# Patient Record
Sex: FEMALE | Race: WHITE | NOT HISPANIC OR LATINO | Employment: UNEMPLOYED | URBAN - METROPOLITAN AREA
[De-identification: names, ages, dates, MRNs, and addresses within clinical notes are randomized per-mention and may not be internally consistent; named-entity substitution may affect disease eponyms.]

---

## 2024-10-03 ENCOUNTER — TELEPHONE (OUTPATIENT)
Dept: OBSTETRICS AND GYNECOLOGY | Age: 24
End: 2024-10-03

## 2024-10-03 NOTE — TELEPHONE ENCOUNTER
Relayed information to pt's mother. Advised that they can call our office before starting any antibiotics for reassurance. Pt's mother stated understanding & has no questions or concerns at this time.

## 2024-10-03 NOTE — TELEPHONE ENCOUNTER
Spoke with pt's mother. Pt's mother is getting over strep throat & believes Negrita might be getting it due to same symptoms. I advised she may need to be seen at urgent care. Pt's mother stated that they called her PCP & was that she needed to call her OBGYN for an antibiotic safe during pregnancy. I advised we don't normally treat for this but that I would send a message. Please advise.

## 2024-10-03 NOTE — TELEPHONE ENCOUNTER
PT 19w 2d is calling in asking if something can be sent in for a soar throat PT says she is sick. Edi PT

## 2024-10-03 NOTE — TELEPHONE ENCOUNTER
Caller: BRAYDEN JULIEN    Relationship: Mother    Best call back number: [phone]      Who are you requesting to speak with (clinical staffELISE      What was the call regarding: BRAYDEN JULIEN ADVISED THAT PATIENT IS SICK HAS SORE THROAT, WOULD LIKE TO KNOW IF MEDICATION CAN BE CALLED INTO THE PHARMACY, PLEASE ADVISE.     ”

## 2024-10-21 ENCOUNTER — TELEPHONE (OUTPATIENT)
Dept: OBSTETRICS AND GYNECOLOGY | Age: 24
End: 2024-10-21

## 2024-10-21 NOTE — TELEPHONE ENCOUNTER
Caller: Negrita Yanez    Relationship: Self    Best call back number:    [phone]    What is the best time to reach you: ASAP    Who are you requesting to speak with (clinical staff, provider,  specific staff member):     DR MONTES    Do you know the name of the person who called:     DR MONTES    What was the call regarding:     UNSURE

## 2024-11-01 ENCOUNTER — ROUTINE PRENATAL (OUTPATIENT)
Dept: OBSTETRICS AND GYNECOLOGY | Age: 24
End: 2024-11-01
Payer: COMMERCIAL

## 2024-11-01 ENCOUNTER — HOSPITAL ENCOUNTER (OUTPATIENT)
Facility: HOSPITAL | Age: 24
Setting detail: OBSERVATION
Discharge: HOME | End: 2024-11-01
Attending: OBSTETRICS & GYNECOLOGY | Admitting: OBSTETRICS & GYNECOLOGY
Payer: COMMERCIAL

## 2024-11-01 VITALS
DIASTOLIC BLOOD PRESSURE: 47 MMHG | OXYGEN SATURATION: 97 % | TEMPERATURE: 98.3 F | WEIGHT: 224 LBS | BODY MASS INDEX: 43.98 KG/M2 | HEART RATE: 86 BPM | HEIGHT: 60 IN | RESPIRATION RATE: 12 BRPM | SYSTOLIC BLOOD PRESSURE: 126 MMHG

## 2024-11-01 VITALS — WEIGHT: 224.6 LBS | BODY MASS INDEX: 43.86 KG/M2 | SYSTOLIC BLOOD PRESSURE: 114 MMHG | DIASTOLIC BLOOD PRESSURE: 76 MMHG

## 2024-11-01 DIAGNOSIS — O34.219 PREVIOUS CESAREAN DELIVERY AFFECTING PREGNANCY: ICD-10-CM

## 2024-11-01 DIAGNOSIS — Z34.92 PRENATAL CARE IN SECOND TRIMESTER: ICD-10-CM

## 2024-11-01 DIAGNOSIS — Z3A.23 23 WEEKS GESTATION OF PREGNANCY: Primary | ICD-10-CM

## 2024-11-01 DIAGNOSIS — O21.0 HYPEREMESIS COMPLICATING PREGNANCY, ANTEPARTUM: ICD-10-CM

## 2024-11-01 DIAGNOSIS — Z21 CONGENITAL HIV INFECTION: ICD-10-CM

## 2024-11-01 PROBLEM — Z34.90 PREGNANCY: Status: ACTIVE | Noted: 2024-11-01

## 2024-11-01 PROCEDURE — 96374 THER/PROPH/DIAG INJ IV PUSH: CPT

## 2024-11-01 PROCEDURE — G0378 HOSPITAL OBSERVATION PER HR: HCPCS

## 2024-11-01 PROCEDURE — 96361 HYDRATE IV INFUSION ADD-ON: CPT

## 2024-11-01 PROCEDURE — 25810000003 LACTATED RINGERS SOLUTION: Performed by: OBSTETRICS & GYNECOLOGY

## 2024-11-01 PROCEDURE — G0463 HOSPITAL OUTPT CLINIC VISIT: HCPCS

## 2024-11-01 PROCEDURE — 25010000002 ONDANSETRON PER 1 MG: Performed by: OBSTETRICS & GYNECOLOGY

## 2024-11-01 RX ORDER — SODIUM CHLORIDE 0.9 % (FLUSH) 0.9 %
10 SYRINGE (ML) INJECTION EVERY 12 HOURS SCHEDULED
Status: DISCONTINUED | OUTPATIENT
Start: 2024-11-01 | End: 2024-11-01 | Stop reason: HOSPADM

## 2024-11-01 RX ORDER — SODIUM CHLORIDE 9 MG/ML
40 INJECTION, SOLUTION INTRAVENOUS AS NEEDED
Status: DISCONTINUED | OUTPATIENT
Start: 2024-11-01 | End: 2024-11-01 | Stop reason: HOSPADM

## 2024-11-01 RX ORDER — ONDANSETRON 2 MG/ML
4 INJECTION INTRAMUSCULAR; INTRAVENOUS EVERY 6 HOURS PRN
Status: DISCONTINUED | OUTPATIENT
Start: 2024-11-01 | End: 2024-11-01 | Stop reason: HOSPADM

## 2024-11-01 RX ADMIN — ONDANSETRON 4 MG: 2 INJECTION, SOLUTION INTRAMUSCULAR; INTRAVENOUS at 13:19

## 2024-11-01 RX ADMIN — Medication 10 ML: at 13:19

## 2024-11-01 RX ADMIN — SODIUM CHLORIDE, POTASSIUM CHLORIDE, SODIUM LACTATE AND CALCIUM CHLORIDE 1000 ML: 600; 310; 30; 20 INJECTION, SOLUTION INTRAVENOUS at 13:18

## 2024-11-01 NOTE — PROGRESS NOTES
Patient is having a nausea and vomiting episode  She feels weak  We discussed oral hydration at home versus IV fluids  The patient really feels like she is dehydrated and we will send her over for IV fluids  Also some IV Zofran  Hoping that she can return to home after that for management  She has been able to take her HIV meds without any problems  We discussed checking her blood sugars as she is reluctant to do the 1 hour glucose challenge test with her problems with nausea  She will check some blood sugars further her next visit  Follow-up 4 weeks    Chief Complaint   Patient presents with    Routine Prenatal Visit     Ob Check - Pt is 23w3d, Pt stating she has not been checking blood sugars, Pt c/o nausea, vomiting & diarrhea, pt stating she has trouble keeping anything down         HPI:  Pt presents for routine prenatal visit    ROS:  No fever or chills, no nausea or vomiting, no contractions, no leg pain, no LE edema, no leaking fluid, no bleeding, no headache, no dysuria  All other systems reviewed and negative    Exam:  See flow sheet  General:  Alert and oriented and no distress  Neck: no lymphadenopathy or thyromegaly  Lungs: non - labored breathing  Abd:  See flow sheet, fundus nontender  Ext: see flow sheet, non-tender bilateral , no lesions  Neuro: grossly normal    Assessment:/ PLAN:    Diagnoses and all orders for this visit:    1. 23 weeks gestation of pregnancy (Primary)  -     POC Urinalysis Dipstick    2. Congenital HIV infection    3. Previous  delivery affecting pregnancy    4. Prenatal care in second trimester    5. Hyperemesis complicating pregnancy, antepartum

## 2024-11-01 NOTE — NURSING NOTE
Discharge instructions reviewed with patient.  Discussed leaking of fluids, vaginal bleeding, and contractions.  Patient to continue to go to all regularly scheduled appointments.  Patient to return to L&D or call on call MD if any questions or concerns.  Patient ambulates off unit with no apparent distress.

## 2024-11-04 ENCOUNTER — TELEPHONE (OUTPATIENT)
Dept: OBSTETRICS AND GYNECOLOGY | Age: 24
End: 2024-11-04
Payer: COMMERCIAL

## 2024-11-04 ENCOUNTER — HOSPITAL ENCOUNTER (EMERGENCY)
Facility: HOSPITAL | Age: 24
Discharge: HOME | End: 2024-11-04
Attending: OBSTETRICS & GYNECOLOGY | Admitting: OBSTETRICS & GYNECOLOGY
Payer: COMMERCIAL

## 2024-11-04 VITALS
RESPIRATION RATE: 16 BRPM | HEIGHT: 60 IN | TEMPERATURE: 98.7 F | SYSTOLIC BLOOD PRESSURE: 117 MMHG | DIASTOLIC BLOOD PRESSURE: 67 MMHG | WEIGHT: 224 LBS | BODY MASS INDEX: 43.98 KG/M2 | HEART RATE: 101 BPM

## 2024-11-04 LAB
ALBUMIN SERPL-MCNC: 3.4 G/DL (ref 3.5–5.2)
ALBUMIN/GLOB SERPL: 1.2 G/DL
ALP SERPL-CCNC: 66 U/L (ref 39–117)
ALT SERPL W P-5'-P-CCNC: 8 U/L (ref 1–33)
ANION GAP SERPL CALCULATED.3IONS-SCNC: 11.3 MMOL/L (ref 5–15)
AST SERPL-CCNC: 10 U/L (ref 1–32)
BACTERIA UR QL AUTO: ABNORMAL /HPF
BASOPHILS # BLD AUTO: 0.02 10*3/MM3 (ref 0–0.2)
BASOPHILS NFR BLD AUTO: 0.2 % (ref 0–1.5)
BILIRUB SERPL-MCNC: <0.2 MG/DL (ref 0–1.2)
BILIRUB UR QL STRIP: NEGATIVE
BUN SERPL-MCNC: 7 MG/DL (ref 6–20)
BUN/CREAT SERPL: 16.7 (ref 7–25)
CALCIUM SPEC-SCNC: 8.5 MG/DL (ref 8.6–10.5)
CHLORIDE SERPL-SCNC: 104 MMOL/L (ref 98–107)
CLARITY UR: ABNORMAL
CO2 SERPL-SCNC: 20.7 MMOL/L (ref 22–29)
COLOR UR: YELLOW
CREAT SERPL-MCNC: 0.42 MG/DL (ref 0.57–1)
DEPRECATED RDW RBC AUTO: 44.8 FL (ref 37–54)
EGFRCR SERPLBLD CKD-EPI 2021: 139.4 ML/MIN/1.73
EOSINOPHIL # BLD AUTO: 0.07 10*3/MM3 (ref 0–0.4)
EOSINOPHIL NFR BLD AUTO: 0.8 % (ref 0.3–6.2)
ERYTHROCYTE [DISTWIDTH] IN BLOOD BY AUTOMATED COUNT: 14 % (ref 12.3–15.4)
GLOBULIN UR ELPH-MCNC: 2.8 GM/DL
GLUCOSE SERPL-MCNC: 119 MG/DL (ref 65–99)
GLUCOSE UR STRIP-MCNC: NEGATIVE MG/DL
HCT VFR BLD AUTO: 30.4 % (ref 34–46.6)
HGB BLD-MCNC: 10.8 G/DL (ref 12–15.9)
HGB UR QL STRIP.AUTO: NEGATIVE
HYALINE CASTS UR QL AUTO: ABNORMAL /LPF
IMM GRANULOCYTES # BLD AUTO: 0.05 10*3/MM3 (ref 0–0.05)
IMM GRANULOCYTES NFR BLD AUTO: 0.6 % (ref 0–0.5)
KETONES UR QL STRIP: ABNORMAL
LEUKOCYTE ESTERASE UR QL STRIP.AUTO: ABNORMAL
LIPASE SERPL-CCNC: 16 U/L (ref 13–60)
LYMPHOCYTES # BLD AUTO: 1.41 10*3/MM3 (ref 0.7–3.1)
LYMPHOCYTES NFR BLD AUTO: 16.7 % (ref 19.6–45.3)
MCH RBC QN AUTO: 31.6 PG (ref 26.6–33)
MCHC RBC AUTO-ENTMCNC: 35.5 G/DL (ref 31.5–35.7)
MCV RBC AUTO: 88.9 FL (ref 79–97)
MONOCYTES # BLD AUTO: 0.5 10*3/MM3 (ref 0.1–0.9)
MONOCYTES NFR BLD AUTO: 5.9 % (ref 5–12)
NEUTROPHILS NFR BLD AUTO: 6.41 10*3/MM3 (ref 1.7–7)
NEUTROPHILS NFR BLD AUTO: 75.8 % (ref 42.7–76)
NITRITE UR QL STRIP: NEGATIVE
NRBC BLD AUTO-RTO: 0 /100 WBC (ref 0–0.2)
PH UR STRIP.AUTO: 6 [PH] (ref 5–8)
PLATELET # BLD AUTO: 226 10*3/MM3 (ref 140–450)
PMV BLD AUTO: 9.6 FL (ref 6–12)
POTASSIUM SERPL-SCNC: 3.3 MMOL/L (ref 3.5–5.2)
PROT SERPL-MCNC: 6.2 G/DL (ref 6–8.5)
PROT UR QL STRIP: ABNORMAL
RBC # BLD AUTO: 3.42 10*6/MM3 (ref 3.77–5.28)
RBC # UR STRIP: ABNORMAL /HPF
REF LAB TEST METHOD: ABNORMAL
SODIUM SERPL-SCNC: 136 MMOL/L (ref 136–145)
SP GR UR STRIP: 1.02 (ref 1–1.03)
SQUAMOUS #/AREA URNS HPF: ABNORMAL /HPF
UROBILINOGEN UR QL STRIP: ABNORMAL
WBC # UR STRIP: ABNORMAL /HPF
WBC NRBC COR # BLD AUTO: 8.46 10*3/MM3 (ref 3.4–10.8)

## 2024-11-04 PROCEDURE — 80053 COMPREHEN METABOLIC PANEL: CPT | Performed by: OBSTETRICS & GYNECOLOGY

## 2024-11-04 PROCEDURE — 25810000003 LACTATED RINGERS SOLUTION: Performed by: OBSTETRICS & GYNECOLOGY

## 2024-11-04 PROCEDURE — 99283 EMERGENCY DEPT VISIT LOW MDM: CPT | Performed by: OBSTETRICS & GYNECOLOGY

## 2024-11-04 PROCEDURE — 96361 HYDRATE IV INFUSION ADD-ON: CPT | Performed by: OBSTETRICS & GYNECOLOGY

## 2024-11-04 PROCEDURE — 87086 URINE CULTURE/COLONY COUNT: CPT | Performed by: OBSTETRICS & GYNECOLOGY

## 2024-11-04 PROCEDURE — 25010000002 ONDANSETRON PER 1 MG: Performed by: OBSTETRICS & GYNECOLOGY

## 2024-11-04 PROCEDURE — 96374 THER/PROPH/DIAG INJ IV PUSH: CPT | Performed by: OBSTETRICS & GYNECOLOGY

## 2024-11-04 PROCEDURE — 83690 ASSAY OF LIPASE: CPT | Performed by: OBSTETRICS & GYNECOLOGY

## 2024-11-04 PROCEDURE — 81001 URINALYSIS AUTO W/SCOPE: CPT | Performed by: OBSTETRICS & GYNECOLOGY

## 2024-11-04 PROCEDURE — 85025 COMPLETE CBC W/AUTO DIFF WBC: CPT | Performed by: OBSTETRICS & GYNECOLOGY

## 2024-11-04 RX ORDER — ONDANSETRON 2 MG/ML
4 INJECTION INTRAMUSCULAR; INTRAVENOUS EVERY 6 HOURS PRN
Status: DISCONTINUED | OUTPATIENT
Start: 2024-11-04 | End: 2024-11-04 | Stop reason: HOSPADM

## 2024-11-04 RX ORDER — POTASSIUM CHLORIDE 1500 MG/1
20 TABLET, EXTENDED RELEASE ORAL 2 TIMES DAILY
Qty: 6 TABLET | Refills: 0 | Status: SHIPPED | OUTPATIENT
Start: 2024-11-04 | End: 2024-11-07

## 2024-11-04 RX ADMIN — SODIUM CHLORIDE, POTASSIUM CHLORIDE, SODIUM LACTATE AND CALCIUM CHLORIDE 1000 ML: 600; 310; 30; 20 INJECTION, SOLUTION INTRAVENOUS at 17:34

## 2024-11-04 RX ADMIN — ONDANSETRON 4 MG: 2 INJECTION, SOLUTION INTRAMUSCULAR; INTRAVENOUS at 17:35

## 2024-11-04 NOTE — OBED NOTES
[facility] Note OB        Patient Name: Negrita Silvestre  YOB: 2000  MRN: [id number]  Admission Date: 2024  4:48 PM  Date of Service: 2024    Chief Complaint: Fall (Pt states she fell this AM after getting out of the  shower around 0900./) and Nausea (Pt states she has been dealing with N/V for about a month./)        Subjective     Negrita Silvestre is a 24 y.o. female  at 23w6d with Estimated Date of Delivery: 25 who presents with two complaints.  The patient states that she has had persistent nausea/vomiting for the past month and generally feels unwell. She fell on her bottom this morning, denies any abdominal trauma.  She sees Lianne Laboy MD for her prenatal care. Her pregnancy has been complicated by:  congenital HIV, Rh negative, hyperemesis, previous  section, history of gestational diabetes, Rh negative.    She describes fetal movement as normal.  She denies rupture of membranes.  She denies vaginal bleeding. She is not feeling contractions.          Objective   Patient Active Problem List    Diagnosis     Hyperemesis complicating pregnancy, antepartum [O21.0]     Pregnancy [Z34.90]     Previous  delivery affecting pregnancy [O34.219]     Nausea and vomiting of pregnancy, antepartum [O21.9]     Nausea and vomiting [R11.2]     Right upper quadrant abdominal pain [R10.11]     Left upper quadrant abdominal pain [R10.12]      delivery delivered [O82]     Encounter for sterilization [Z30.2]      labor in third trimester [O60.03]     Rh negative status during pregnancy in third trimester [O26.893, Z67.91]     Increased BMI [R63.8]     Maternal anemia in pregnancy, antepartum [O99.019]     Anxiety in pregnancy, antepartum [O99.340, F41.9]     Congenital HIV infection [Z21]     Diet controlled gestational diabetes mellitus (GDM) in third trimester [O24.410]         OB History    Para Term  AB Living   2 1 1 0 0 1    SAB IAB Ectopic Molar Multiple Live Births   0 0 0 0 0 1      # Outcome Date GA Lbr Michael/2nd Weight Sex Type Anes PTL Lv   2 Current            1 Term 02/10/24 38w0d  3960 g (8 lb 11.7 oz) F CS-LTranv Spinal N BAKARI      Birth Comments: panda or 1      Name: Deirdre Miranda      Apgar1: 8  Apgar5: 9        Past Medical History:   Diagnosis Date    Anxiety     on Zoloft    Asperger's disorder     Depression     Gestational diabetes     diet controlled    HIV (human immunodeficiency virus infection)     congential, on meds    [place] teeth extracted        Past Surgical History:   Procedure Laterality Date     SECTION WITH TUBAL N/A 2/10/2024    Procedure:  SECTION PRIMARY WITH TUBAL;  Surgeon: Nancy Sheth MD;  Location: [facility] LABOR DELIVERY;  Service: Obstetrics/Gynecology;  Laterality: N/A;    ENDOSCOPY N/A 2024    Procedure: ESOPHAGOGASTRODUODENOSCOPY WITH BIOPSIES;  Surgeon: Josue Wright MD;  [place] [facility] [place]  Service: Gastroenterology;  Laterality: N/A;  esophagitis    EYE SURGERY         No current facility-administered medications on file prior to encounter.     Current Outpatient Medications on File Prior to Encounter   Medication Sig Dispense Refill    Emtricitabine-Tenofovir AF (Descovy) 200-25 MG per tablet Take 1 tablet by mouth Daily for 90 days. 30 tablet 2    ondansetron ODT (ZOFRAN-ODT) 8 MG disintegrating tablet Place 1 tablet on the tongue Every 12 (Twelve) Hours As Needed for Nausea or Vomiting. 30 tablet 1    pantoprazole (PROTONIX) 40 MG EC tablet Take 1 tablet by mouth Daily. 90 tablet 3    Prenatal Vit-Fe Fumarate-FA (Prenatal Vitamin) 27-0.8 MG tablet Take 1 tablet by mouth Daily. 30 tablet 12    Prezista 600 MG tablet TAKE 1 TABLET BY MOUTH TWICE DAILY WITH MEALS 60 tablet 2    promethazine (PHENERGAN) 12.5 MG tablet Take 1 tablet by mouth Every 6 (Six) Hours As Needed for Nausea or Vomiting. 30 tablet 1    ritonavir (NORVIR) 100 MG  "tablet Take 1 tablet by mouth 2 (Two) Times a Day for 90 days. 60 tablet 2    sertraline (Zoloft) 100 MG tablet Take 2 tablets by mouth Daily for 364 days. 180 tablet 3       Allergies   Allergen Reactions    Bactrim [Sulfamethoxazole-Trimethoprim] Hives    Sulfa Antibiotics Hives    [place] Rash       Family History   Adopted: Yes   Problem Relation Age of Onset    Colon cancer Neg Hx     Colon polyps Neg Hx     Crohn's disease Neg Hx     Irritable bowel syndrome Neg Hx     Ulcerative colitis Neg Hx        Social History     Socioeconomic History    Marital status:    Tobacco Use    Smoking status: Former     Current packs/day: 0.00     Types: Cigarettes     Quit date: 2023     Years since quittin.5     Passive exposure: Never    Smokeless tobacco: Never   Vaping Use    Vaping status: Former    Quit date: 2023   Substance and Sexual Activity    Alcohol use: Not Currently    Drug use: Not Currently    Sexual activity: Yes     Partners: Male     Birth control/protection: None           Review of Systems   Constitutional:  Positive for fatigue.   HENT: Negative.     Eyes: Negative.    Respiratory: Negative.     Gastrointestinal:  Positive for nausea and vomiting. Negative for abdominal pain.   Genitourinary: Negative.    Neurological: Negative.           PHYSICAL EXAM:      VITAL SIGNS:  Vitals:    24 1709   BP: 117/67   BP Location: Right arm   Patient Position: Sitting   Pulse: 101   Resp: 16   Temp: 98.7 °F (37.1 °C)   TempSrc: Oral   Weight: 102 kg (224 lb)   Height: 152.4 cm (60\")        FHT:              140 bpm        PHYSICAL EXAM:    General: well developed; well nourished  no acute distress  mentation appropriate   Heart: Not performed.   Lungs  : breathing is unlabored     Abdomen: soft, non-tender; no masses  no umbilical or inguinal hernias are present  no hepato-splenomegaly       Cervix: was not checked.      Contractions: none        Extremities: peripheral pulses normal, no " pedal edema, no clubbing or cyanosis      LABS AND TESTING ORDERED:  Uterine and fetal monitoring  Urinalysis  CBC, CMP, Lipase  IV fluid bolus    LAB RESULTS:    Recent Results (from the past 24 hours)   Comprehensive Metabolic Panel    Collection Time: 11/04/24  5:34 PM    Specimen: Arm, Left; Blood   Result Value Ref Range    Glucose 119 (H) 65 - 99 mg/dL    BUN 7 6 - 20 mg/dL    Creatinine 0.42 (L) 0.57 - 1.00 mg/dL    Sodium 136 136 - 145 mmol/L    Potassium 3.3 (L) 3.5 - 5.2 mmol/L    Chloride 104 98 - 107 mmol/L    CO2 20.7 (L) 22.0 - 29.0 mmol/L    Calcium 8.5 (L) 8.6 - 10.5 mg/dL    Total Protein 6.2 6.0 - 8.5 g/dL    Albumin 3.4 (L) 3.5 - 5.2 g/dL    ALT (SGPT) 8 1 - 33 U/L    AST (SGOT) 10 1 - 32 U/L    Alkaline Phosphatase 66 39 - 117 U/L    Total Bilirubin <0.2 0.0 - 1.2 mg/dL    Globulin 2.8 gm/dL    A/G Ratio 1.2 g/dL    BUN/Creatinine Ratio 16.7 7.0 - 25.0    Anion Gap 11.3 5.0 - 15.0 mmol/L    eGFR 139.4 >60.0 mL/min/1.73   CBC Auto Differential    Collection Time: 11/04/24  5:34 PM    Specimen: Arm, Left; Blood   Result Value Ref Range    WBC 8.46 3.40 - 10.80 10*3/mm3    RBC 3.42 (L) 3.77 - 5.28 10*6/mm3    Hemoglobin 10.8 (L) 12.0 - 15.9 g/dL    Hematocrit 30.4 (L) 34.0 - 46.6 %    MCV 88.9 79.0 - 97.0 fL    MCH 31.6 26.6 - 33.0 pg    MCHC 35.5 31.5 - 35.7 g/dL    RDW 14.0 12.3 - 15.4 %    RDW-SD 44.8 37.0 - 54.0 fl    MPV 9.6 6.0 - 12.0 fL    Platelets 226 140 - 450 10*3/mm3    Neutrophil % 75.8 42.7 - 76.0 %    Lymphocyte % 16.7 (L) 19.6 - 45.3 %    Monocyte % 5.9 5.0 - 12.0 %    Eosinophil % 0.8 0.3 - 6.2 %    Basophil % 0.2 0.0 - 1.5 %    Immature Grans % 0.6 (H) 0.0 - 0.5 %    Neutrophils, Absolute 6.41 1.70 - 7.00 10*3/mm3    Lymphocytes, Absolute 1.41 0.70 - 3.10 10*3/mm3    Monocytes, Absolute 0.50 0.10 - 0.90 10*3/mm3    Eosinophils, Absolute 0.07 0.00 - 0.40 10*3/mm3    Basophils, Absolute 0.02 0.00 - 0.20 10*3/mm3    Immature Grans, Absolute 0.05 0.00 - 0.05 10*3/mm3    nRBC 0.0 0.0 -  0.2 /100 WBC   Lipase    Collection Time: 24  5:34 PM    Specimen: Blood   Result Value Ref Range    Lipase 16 13 - 60 U/L   Urinalysis With Culture If Indicated - Urine, Clean Catch    Collection Time: 24  5:36 PM    Specimen: Urine, Clean Catch   Result Value Ref Range    Color, UA Yellow Yellow, Straw    Appearance, UA Cloudy (A) Clear    pH, UA 6.0 5.0 - 8.0    Specific Gravity, UA 1.025 1.005 - 1.030    Glucose, UA Negative Negative    Ketones, UA Trace (A) Negative    Bilirubin, UA Negative Negative    Blood, UA Negative Negative    Protein, UA Trace (A) Negative    Leuk Esterase, UA Moderate (2+) (A) Negative    Nitrite, UA Negative Negative    Urobilinogen, UA 1.0 E.U./dL 0.2 - 1.0 E.U./dL   Urinalysis, Microscopic Only - Urine, Clean Catch    Collection Time: 24  5:36 PM    Specimen: Urine, Clean Catch   Result Value Ref Range    RBC, UA 0-2 None Seen, 0-2 /HPF    WBC, UA 11-20 (A) None Seen, 0-2 /HPF    Bacteria, UA 3+ (A) None Seen /HPF    Squamous Epithelial Cells, UA 13-20 (A) None Seen, 0-2 /HPF    Hyaline Casts, UA 0-2 None Seen /LPF    Methodology Automated Microscopy        Lab Results   Component Value Date    ABO A 2024    RH Negative 2024       Lab Results   Component Value Date    STREPGPB Negative 2024                 Assessment & Plan     ASSESSMENT/PLAN:  Negrita Silvestre is a 24 y.o. female  at 23w6d who presented with:     Nausea/vomiting  S/p fall  Hypokalemia        PLAN:     Patient given IV fluid bolus and IV Zofran.  She is able to tolerate crackers and desires discharge   Patient has Zofran and Phenergan at home  Prescription for Kdur 20 meq sent to pharmacy for mild hypokalemia  Patient discharged home.  She agrees to return to [facility] if she is not able to tolerate oral intake or has signs of labor.    I have spent 30 minutes including face to face time with the patient, greater than 50% in discussion of the diagnosis (counseling) and/or  coordination of care.     Carol Rosales MD  11/4/2024  19:00 EST  OB Hospitalist  Phone:  [phone]

## 2024-11-04 NOTE — TELEPHONE ENCOUNTER
Pt currently 23w6d and was recently treated at the hospital with IV fluids.  Pt's mother called stating her daughter is still very weak and vomiting.  She is not keeping anything down.  She states she fell this morning due to the weakness.  She did not hit her abdomen.  Advised that pt needs to return to the hospital for treatment but she wanted to check with Dr. Edi mulligan.  Please advise.

## 2024-11-05 ENCOUNTER — TELEPHONE (OUTPATIENT)
Dept: OBSTETRICS AND GYNECOLOGY | Age: 24
End: 2024-11-05

## 2024-11-05 NOTE — TELEPHONE ENCOUNTER
Caller: BRAYDEN JULIEN    Relationship: Mother    Best call back number: [phone]    What orders are you requesting (i.e. lab or imaging): HPOLARI TEST    In what timeframe would the patient need to come in: ASAP    Where will you receive your lab/imaging services: [facility] [address] OR CAN GO TO [facility] IF NEEDED    Additional notes: PT'S MOTHER CALLED IN WANTING TO KNOW IF PT COULD BE TESTED FOR HPOLARI.

## 2024-11-06 LAB — BACTERIA SPEC AEROBE CULT: NO GROWTH

## 2024-11-15 ENCOUNTER — TELEPHONE (OUTPATIENT)
Dept: OBSTETRICS AND GYNECOLOGY | Age: 24
End: 2024-11-15

## 2024-11-15 NOTE — TELEPHONE ENCOUNTER
See message. Pt is 25w3d. I phoned & spoke with pt's mother regarding concerns. Pt's mother stated Negrita has been feeling spontaneous abdominal cramping x's a few days. She states it its not consistent but at times happens 4-5 times within an hour. Negrita's mother states she feels like it is contractions and not rickey ramírez. She denies any bleeding or fluid loss but states the pt has had white mucous-like discharge. Pt does not have an appt with Dr. Daley until 12/2/24. Please advise.

## 2024-11-15 NOTE — TELEPHONE ENCOUNTER
Provider:  DR MONTES    Caller:BRAYDEN NIETOLEY-MOTHER    Phone Number:[phone]     Reason for Call:PATIENTS MOTHER IS CALLING TO SEE IF A LETTER CAN BE SENT TO HER MY CHART FOR [employer] STATING THAT THE PATIENT ISNT ABLE TO WORK AND THAT SHE IS PREGNANT WITH EXPECTED DUE DATE//    ALSO THE MOTHER IS CONCERNED BECAUSE SHE FEELS LIKE THE PATIENT WAS HAVING CONTRACTIONS LAST NIGHT BUT THEY WILL COME AND GO//NOT SURE WHAT THEY NEED TO DO IF THIS HAPPENS AGAIN BECAUSE SHE IS JUST OVER 25 WEEKS//PLEASE FOLLOW UP    MOTHER STATED THAT SHE WOULD PROBABLY BE BEST TO CONTACT BECAUSE PATIENT SLEEPS A LOT BECAUSE SHE IS EXHAUSTED- HAS AUTOIMMUNE ISSUES

## 2024-11-16 ENCOUNTER — HOSPITAL ENCOUNTER (EMERGENCY)
Facility: HOSPITAL | Age: 24
Discharge: HOME | End: 2024-11-16
Attending: OBSTETRICS & GYNECOLOGY | Admitting: OBSTETRICS & GYNECOLOGY
Payer: COMMERCIAL

## 2024-11-16 VITALS
BODY MASS INDEX: 44.88 KG/M2 | HEIGHT: 60 IN | SYSTOLIC BLOOD PRESSURE: 121 MMHG | DIASTOLIC BLOOD PRESSURE: 72 MMHG | OXYGEN SATURATION: 97 % | TEMPERATURE: 98.4 F | HEART RATE: 89 BPM | RESPIRATION RATE: 16 BRPM | WEIGHT: 228.6 LBS

## 2024-11-16 LAB
BACTERIA UR QL AUTO: ABNORMAL /HPF
BILIRUB UR QL STRIP: NEGATIVE
CLARITY UR: ABNORMAL
COD CRY URNS QL: PRESENT /HPF
COLOR UR: YELLOW
GLUCOSE UR STRIP-MCNC: NEGATIVE MG/DL
HGB UR QL STRIP.AUTO: NEGATIVE
HYALINE CASTS UR QL AUTO: ABNORMAL /LPF
KETONES UR QL STRIP: NEGATIVE
LEUKOCYTE ESTERASE UR QL STRIP.AUTO: ABNORMAL
NITRITE UR QL STRIP: NEGATIVE
PH UR STRIP.AUTO: 6 [PH] (ref 5–8)
PROT UR QL STRIP: NEGATIVE
RBC # UR STRIP: ABNORMAL /HPF
REF LAB TEST METHOD: ABNORMAL
SP GR UR STRIP: 1.02 (ref 1–1.03)
SQUAMOUS #/AREA URNS HPF: ABNORMAL /HPF
UROBILINOGEN UR QL STRIP: ABNORMAL
WBC # UR STRIP: ABNORMAL /HPF

## 2024-11-16 PROCEDURE — 99284 EMERGENCY DEPT VISIT MOD MDM: CPT | Performed by: OBSTETRICS & GYNECOLOGY

## 2024-11-16 PROCEDURE — 87086 URINE CULTURE/COLONY COUNT: CPT | Performed by: OBSTETRICS & GYNECOLOGY

## 2024-11-16 PROCEDURE — 81001 URINALYSIS AUTO W/SCOPE: CPT | Performed by: OBSTETRICS & GYNECOLOGY

## 2024-11-17 NOTE — OBED NOTES
[facility] Provider Note        2024 21:29 BRINDA Silvestre is a 24 y.o.  at 25w4d BRIGID  2025, by Other Basis who presents for : Contractions (Started last night and throughout the day today. Stopped around 0700 and then restarted. Denies vaginal bleeding but having some mucus. Positive fetal movement.)          HPI: Patient complains of intermittent contractions for the past two days that resolved while in [facility]. She denies leaking fluid or vaginal bleeding. She feels fetal movement.    Active fetus Yes   denies ROM  admits tocontractions, cramping   denies bleeding    Prenatal care with Nancy Daley MD complicated by congenital HIV, previous  section, obesity, anxiety.    Patient Active Problem List    Diagnosis     Hyperemesis complicating pregnancy, antepartum [O21.0]     Pregnancy [Z34.90]     Previous  delivery affecting pregnancy [O34.219]     Nausea and vomiting of pregnancy, antepartum [O21.9]     Nausea and vomiting [R11.2]     Right upper quadrant abdominal pain [R10.11]     Left upper quadrant abdominal pain [R10.12]      delivery delivered [O82]     Encounter for sterilization [Z30.2]      labor in third trimester [O60.03]     Rh negative status during pregnancy in third trimester [O26.893, Z67.91]     Increased BMI [R63.8]     Maternal anemia in pregnancy, antepartum [O99.019]     Anxiety in pregnancy, antepartum [O99.340, F41.9]     Congenital HIV infection [Z21]     Diet controlled gestational diabetes mellitus (GDM) in third trimester [O24.410]          POB:   OB History    Para Term  AB Living   2 1 1 0 0 1   SAB IAB Ectopic Molar Multiple Live Births   0 0 0 0 0 1      # Outcome Date GA Lbr Michael/2nd Weight Sex Type Anes PTL Lv   2 Current            1 Term 02/10/24 38w0d  3960 g (8 lb 11.7 oz) F CS-LTranv Spinal N BAKARI      Birth Comments: panda or 1      Name: Elmsfordbrittaney Pena Ruth      Apgar1: 8  Apgar5: 9       PMH:   Past Medical History:   Diagnosis Date    Anxiety     on Zoloft    Asperger's disorder     Depression     Gestational diabetes     diet controlled    HIV (human immunodeficiency virus infection)     congential, on meds    [place] teeth extracted      PSH:   Past Surgical History:   Procedure Laterality Date     SECTION WITH TUBAL N/A 2/10/2024    Procedure:  SECTION PRIMARY WITH TUBAL;  Surgeon: Nancy Sheth MD;  Location: [facility] LABOR DELIVERY;  Service: Obstetrics/Gynecology;  Laterality: N/A;    ENDOSCOPY N/A 2024    Procedure: ESOPHAGOGASTRODUODENOSCOPY WITH BIOPSIES;  Surgeon: Josue Wright MD;  Location: [facility] [place];  Service: Gastroenterology;  Laterality: N/A;  esophagitis    EYE SURGERY       FH:    Family History   Adopted: Yes   Problem Relation Age of Onset    Colon cancer Neg Hx     Colon polyps Neg Hx     Crohn's disease Neg Hx     Irritable bowel syndrome Neg Hx     Ulcerative colitis Neg Hx      SH:   Social History     Tobacco Use    Smoking status: Every Day     Current packs/day: 0.25     Average packs/day: 0.3 packs/day for 0.9 years (0.2 ttl pk-yrs)     Types: Cigarettes     Start date:      Passive exposure: Never    Smokeless tobacco: Never   Vaping Use    Vaping status: Some Days    Last attempt to quit: 2023    Substances: Nicotine   Substance Use Topics    Alcohol use: Not Currently    Drug use: Not Currently       Allergies: Bactrim [sulfamethoxazole-trimethoprim], Sulfa antibiotics, and Strawberry    Medications:   Medications Prior to Admission   Medication Sig Dispense Refill Last Dose/Taking    Emtricitabine-Tenofovir AF (Descovy) 200-25 MG per tablet Take 1 tablet by mouth Daily for 90 days. 30 tablet 2 2024    ondansetron ODT (ZOFRAN-ODT) 8 MG disintegrating tablet Place 1 tablet on the tongue Every 12 (Twelve) Hours As Needed for Nausea or Vomiting. 30 tablet 1 2024    pantoprazole (PROTONIX) 40 MG EC tablet  "Take 1 tablet by mouth Daily. 90 tablet 3 11/16/2024    Prenatal Vit-Fe Fumarate-FA (Prenatal Vitamin) 27-0.8 MG tablet Take 1 tablet by mouth Daily. 30 tablet 12 11/16/2024    Prezista 600 MG tablet TAKE 1 TABLET BY MOUTH TWICE DAILY WITH MEALS 60 tablet 2 11/16/2024    promethazine (PHENERGAN) 12.5 MG tablet Take 1 tablet by mouth Every 6 (Six) Hours As Needed for Nausea or Vomiting. 30 tablet 1 11/16/2024    ritonavir (NORVIR) 100 MG tablet Take 1 tablet by mouth 2 (Two) Times a Day for 90 days. 60 tablet 2 11/16/2024    sertraline (Zoloft) 100 MG tablet Take 2 tablets by mouth Daily for 364 days. 180 tablet 3 11/16/2024       Review of Systems  A 14 system review was completed and negative except for what is noted in the HPI or below        Physical exam    Temp:  [98.4 °F (36.9 °C)] 98.4 °F (36.9 °C)  Heart Rate:  [89] 89  Resp:  [16] 16  BP: (121)/(72) 121/72  Estimated body mass index is 44.65 kg/m² as calculated from the following:    Height as of this encounter: 152.4 cm (60\").    Weight as of this encounter: 104 kg (228 lb 9.6 oz).    General appearance - alert, well appearing, and in no distress  Chest - not examined  Heart - not examined  Abdomen - soft, nontender, nondistended, no masses or organomegaly  Extremities - peripheral pulses normal, no pedal edema, no clubbing or cyanosis                  Vaginal Exam  Method: sterile vaginal exam performed  Vaginal Bleeding: not present  Cervical Position: mid-position  Cervical Consistency: medium  Cervical Dilation (cm): 0  Cervical Effacement: 20  Fetal Station: -3          FHR: Reactive for gestational age  CTX: none          External Prenatal Results       Pregnancy Outside Results - Transcribed From Office Records - See Scanned Records For Details       Test Value Date Time    ABO  A  07/11/24 1032    Rh  Negative  07/11/24 1032    Antibody Screen  Negative  07/11/24 1032    Varicella IgG       Rubella  1.33 index 07/11/24 1032    Hgb  10.8 g/dL " 24 1734       11.3 g/dL 10/18/24 1355       13.9 g/dL 24 1032       12.9 g/dL 24 1135    Hct  30.4 % 24 1734       33.2 % 10/18/24 1355       38.5 % 24 1032       39.2 % 24 1135    HgB A1c        1h GTT       3h GTT Fasting       3h GTT 1 hour       3h GTT 2 hour       3h GTT 3 hour        Gonorrhea (discrete)  Negative  24 1641       Negative  24 1059    Chlamydia (discrete)  Negative  24 1641       Negative  24 1059    RPR  Non Reactive  24 1032    Syphils cascade: TP-Ab (FTA)       TP-Ab       TP-Ab (EIA)       TPPA       HBsAg  Negative  24 1032    Herpes Simplex Virus PCR       Herpes Simplex VIrus Culture       HIV  Preliminary Reactive  24 1032    Hep C RNA Quant PCR       Hep C Antibody  Non Reactive  24 1032    AFP  16.7 ng/mL 24 1046    NIPT       Cystic Fibrosis ([place])       Cystic Fibroisis        Spinal Muscular atrophy  Positive  23 0857    Fragile X       Group B Strep       GBS Susceptibility to Clindamycin       GBS Susceptibility to Erythromycin       Fetal Fibronectin       Genetic Testing, Maternal Blood                 Drug Screening       Test Value Date Time    Urine Drug Screen       Amphetamine Screen       Barbiturate Screen       Benzodiazepine Screen       Methadone Screen       Phencyclidine Screen       Opiates Screen       THC Screen       Cocaine Screen       Propoxyphene Screen       Buprenorphine Screen       Methamphetamine Screen       Oxycodone Screen       Tricyclic Antidepressants Screen                 Legend    ^: Historical                              Impression:   @ 25w4d .  Final Diagnosis:  contractions, resolved    Plan:  1. Discharge home with labor precautions  2.  Patient will keep her scheduled outpatient prenatal appointment.          Carol Rosales MD  2024  21:29 EST

## 2024-11-18 ENCOUNTER — TELEPHONE (OUTPATIENT)
Dept: OBSTETRICS AND GYNECOLOGY | Age: 24
End: 2024-11-18
Payer: COMMERCIAL

## 2024-11-18 LAB — BACTERIA SPEC AEROBE CULT: NORMAL

## 2024-11-24 ENCOUNTER — HOSPITAL ENCOUNTER (EMERGENCY)
Facility: HOSPITAL | Age: 24
Discharge: HOME | End: 2024-11-24
Attending: OBSTETRICS & GYNECOLOGY | Admitting: OBSTETRICS & GYNECOLOGY
Payer: COMMERCIAL

## 2024-11-24 VITALS
HEIGHT: 60 IN | HEART RATE: 112 BPM | SYSTOLIC BLOOD PRESSURE: 119 MMHG | TEMPERATURE: 98.9 F | RESPIRATION RATE: 18 BRPM | WEIGHT: 228.2 LBS | DIASTOLIC BLOOD PRESSURE: 65 MMHG | BODY MASS INDEX: 44.8 KG/M2

## 2024-11-24 LAB
BACTERIA UR QL AUTO: ABNORMAL /HPF
BILIRUB UR QL STRIP: NEGATIVE
CLARITY UR: ABNORMAL
COLOR UR: YELLOW
GLUCOSE UR STRIP-MCNC: NEGATIVE MG/DL
HGB UR QL STRIP.AUTO: NEGATIVE
HYALINE CASTS UR QL AUTO: ABNORMAL /LPF
KETONES UR QL STRIP: ABNORMAL
LEUKOCYTE ESTERASE UR QL STRIP.AUTO: ABNORMAL
NITRITE UR QL STRIP: NEGATIVE
PH UR STRIP.AUTO: 6.5 [PH] (ref 5–8)
PROT UR QL STRIP: NEGATIVE
RBC # UR STRIP: ABNORMAL /HPF
REF LAB TEST METHOD: ABNORMAL
SP GR UR STRIP: 1.03 (ref 1–1.03)
SQUAMOUS #/AREA URNS HPF: ABNORMAL /HPF
UROBILINOGEN UR QL STRIP: ABNORMAL
WBC # UR STRIP: ABNORMAL /HPF

## 2024-11-24 PROCEDURE — 81001 URINALYSIS AUTO W/SCOPE: CPT | Performed by: OBSTETRICS & GYNECOLOGY

## 2024-11-24 PROCEDURE — 99284 EMERGENCY DEPT VISIT MOD MDM: CPT | Performed by: OBSTETRICS & GYNECOLOGY

## 2024-11-24 PROCEDURE — 59025 FETAL NON-STRESS TEST: CPT

## 2024-11-24 NOTE — OBED NOTES
[facility] [place] [facility]        Patient Name: Negrita Silvestre  YOB: 2000  MRN: [id number]  Admission Date: 2024  1:42 AM  Date of Service: 2024    Chief Complaint: Contractions (Arrived to [facility] with contractions that started 5 or 6 pm, and states contractions are 1-2 minutes apart.  Denies vaginal bleeding, denies rupture of membranes.  Baby active.)        Subjective     Negrita Silvestre is a 24 y.o. female  at 26w5d with Estimated Date of Delivery: 25 who presents with the chief complaint of contractions since yesterday evening.  She sees Nancy Daley MD for her prenatal care. Her pregnancy has been complicated by:   congenital HIV, previous  section, obesity, anemia, anxiety .        She describes fetal movement as normal.  She denies rupture of membranes.  She denies vaginal bleeding. She is not feeling contractions.          Objective   Patient Active Problem List    Diagnosis     Hyperemesis complicating pregnancy, antepartum [O21.0]     Pregnancy [Z34.90]     Previous  delivery affecting pregnancy [O34.219]     Nausea and vomiting of pregnancy, antepartum [O21.9]     Nausea and vomiting [R11.2]     Right upper quadrant abdominal pain [R10.11]     Left upper quadrant abdominal pain [R10.12]      delivery delivered [O82]     Encounter for sterilization [Z30.2]      labor in third trimester [O60.03]     Rh negative status during pregnancy in third trimester [O26.893, Z67.91]     Increased BMI [R63.8]     Maternal anemia in pregnancy, antepartum [O99.019]     Anxiety in pregnancy, antepartum [O99.340, F41.9]     Congenital HIV infection [Z21]     Diet controlled gestational diabetes mellitus (GDM) in third trimester [O24.410]         OB History    Para Term  AB Living   2 1 1 0 0 1   SAB IAB Ectopic Molar Multiple Live Births   0 0 0 0 0 1      # Outcome Date GA Lbr Michael/2nd Weight Sex Type Anes PTL Lv   2 Current             1 Term 02/10/24 38w0d  3960 g (8 lb 11.7 oz) F CS-LTranv Spinal N BAKARI      Birth Comments: panda or 1      Name: Deirdre Miranda      Apgar1: 8  Apgar5: 9        Past Medical History:   Diagnosis Date    Anxiety     on Zoloft    Asperger's disorder     Depression     Gestational diabetes     diet controlled    HIV (human immunodeficiency virus infection)     congential, on meds    [place] teeth extracted        Past Surgical History:   Procedure Laterality Date     SECTION WITH TUBAL N/A 2/10/2024    Procedure:  SECTION PRIMARY WITH TUBAL;  Surgeon: Nancy Sheth MD;  Location: [facility] LABOR DELIVERY;  Service: Obstetrics/Gynecology;  Laterality: N/A;    ENDOSCOPY N/A 2024    Procedure: ESOPHAGOGASTRODUODENOSCOPY WITH BIOPSIES;  Surgeon: Josue Wright MD;  Location: [facility] MAIN OR;  Service: Gastroenterology;  Laterality: N/A;  esophagitis    EYE SURGERY         No current facility-administered medications on file prior to encounter.     Current Outpatient Medications on File Prior to Encounter   Medication Sig Dispense Refill    Emtricitabine-Tenofovir AF (Descovy) 200-25 MG per tablet Take 1 tablet by mouth Daily for 90 days. 30 tablet 2    ondansetron ODT (ZOFRAN-ODT) 8 MG disintegrating tablet Place 1 tablet on the tongue Every 12 (Twelve) Hours As Needed for Nausea or Vomiting. 30 tablet 1    pantoprazole (PROTONIX) 40 MG EC tablet Take 1 tablet by mouth Daily. 90 tablet 3    Prenatal Vit-Fe Fumarate-FA (Prenatal Vitamin) 27-0.8 MG tablet Take 1 tablet by mouth Daily. 30 tablet 12    Prezista 600 MG tablet TAKE 1 TABLET BY MOUTH TWICE DAILY WITH MEALS 60 tablet 2    promethazine (PHENERGAN) 12.5 MG tablet Take 1 tablet by mouth Every 6 (Six) Hours As Needed for Nausea or Vomiting. 30 tablet 1    ritonavir (NORVIR) 100 MG tablet Take 1 tablet by mouth 2 (Two) Times a Day for 90 days. 60 tablet 2    sertraline (Zoloft) 100 MG tablet Take 2 tablets by mouth Daily for  "364 days. 180 tablet 3       Allergies   Allergen Reactions    Bactrim [Sulfamethoxazole-Trimethoprim] Hives    Sulfa Antibiotics Hives    [place] Rash       Family History   Adopted: Yes   Problem Relation Age of Onset    Colon cancer Neg Hx     Colon polyps Neg Hx     Crohn's disease Neg Hx     Irritable bowel syndrome Neg Hx     Ulcerative colitis Neg Hx        Social History     Socioeconomic History    Marital status:    Tobacco Use    Smoking status: Every Day     Current packs/day: 0.25     Average packs/day: 0.3 packs/day for 5.9 years (1.5 ttl pk-yrs)     Types: Cigarettes     Start date: 2019     Passive exposure: Never    Smokeless tobacco: Never   Vaping Use    Vaping status: Some Days    Last attempt to quit: 5/1/2023    Substances: Nicotine   Substance and Sexual Activity    Alcohol use: Not Currently    Drug use: Not Currently    Sexual activity: Yes     Partners: Male     Birth control/protection: None           Review of Systems   HENT: Negative.     Respiratory: Negative.     Cardiovascular: Negative.    Gastrointestinal:  Positive for abdominal pain.   Genitourinary: Negative.    Neurological: Negative.        PHYSICAL EXAM:      VITAL SIGNS:  Vitals:    11/24/24 0143 11/24/24 0211   BP:  119/65   BP Location:  Right arm   Patient Position:  Lying   Pulse:  112   Resp:  18   Temp:  98.9 °F (37.2 °C)   TempSrc:  Oral   Weight: 104 kg (228 lb 3.2 oz)    Height:  152.4 cm (60\")        FHT:                   Baseline:  130 BPM  Variability:  Moderate = 6 - 25 BPM  Accelerations:  15 x 15 accelerations present     Decelerations:  absent  Contractions:   absent     Interpretation:    Reactive NST, CAT 1 tracing        PHYSICAL EXAM:    General: well developed; well nourished  no acute distress  mentation appropriate   Heart: Not performed.   Lungs  : breathing is unlabored     Abdomen: soft, non-tender; no masses  no umbilical or inguinal hernias are present  no hepato-splenomegaly     "   Cervix: was checked (by RN): 0 cm / 0 % / -3  Cervical Dilation (cm): 0  Cervical Effacement: 0  Fetal Station: -3  Cervical Consistency: firm  Cervical Position: posterior   Contractions: none        Extremities: peripheral pulses normal, no pedal edema, no clubbing or cyanosis      LABS AND TESTING ORDERED:  Uterine and fetal monitoring  Urinalysis    LAB RESULTS:    Recent Results (from the past 24 hours)   Urinalysis With Microscopic If Indicated (No Culture) - Urine, Clean Catch    Collection Time: 24  2:16 AM    Specimen: Urine, Clean Catch   Result Value Ref Range    Color, UA Yellow Yellow, Straw    Appearance, UA Cloudy (A) Clear    pH, UA 6.5 5.0 - 8.0    Specific Gravity, UA 1.026 1.005 - 1.030    Glucose, UA Negative Negative    Ketones, UA 15 mg/dL (1+) (A) Negative    Bilirubin, UA Negative Negative    Blood, UA Negative Negative    Protein, UA Negative Negative    Leuk Esterase, UA Moderate (2+) (A) Negative    Nitrite, UA Negative Negative    Urobilinogen, UA 1.0 E.U./dL 0.2 - 1.0 E.U./dL   Urinalysis, Microscopic Only - Urine, Clean Catch    Collection Time: 24  2:16 AM    Specimen: Urine, Clean Catch   Result Value Ref Range    RBC, UA 0-2 None Seen, 0-2 /HPF    WBC, UA 11-20 (A) None Seen, 0-2 /HPF    Bacteria, UA 2+ (A) None Seen /HPF    Squamous Epithelial Cells, UA 13-20 (A) None Seen, 0-2 /HPF    Hyaline Casts, UA None Seen None Seen /LPF    Methodology Automated Microscopy        Lab Results   Component Value Date    ABO A 2024    RH Negative 2024       Lab Results   Component Value Date    STREPGPB Negative 2024                 Assessment & Plan     ASSESSMENT/PLAN:  Negrita Silvestre is a 24 y.o. female  at 26w5d who presented with: contractions        PLAN:     Cervix closed  Reactive NST for gestational age  No contractions on toco  Patient discharged home with labor precautions.  She will keep her upcoming prenatal appointment.    I have spent  20 minutes including face to face time with the patient, greater than 50% in discussion of the diagnosis (counseling) and/or coordination of care.     Carol Rosales MD  11/24/2024  02:36 EST  OB Hospitalist  Phone:  [phone]

## 2024-11-24 NOTE — NURSING NOTE
Negrita Silvestre, a  at 26w5d with an BRIGID of 2025, by Other Basis, was seen at [facility] OBSTETRIC EMERGENCY DEPARTMENT for a nonstress test.    Chief Complaint   Patient presents with    Contractions     Arrived to [facility] with contractions that started 5 or 6 pm, and states contractions are 1-2 minutes apart.  Denies vaginal bleeding, denies rupture of membranes.  Baby active.       Patient Active Problem List   Diagnosis    Congenital HIV infection    Diet controlled gestational diabetes mellitus (GDM) in third trimester    Maternal anemia in pregnancy, antepartum    Anxiety in pregnancy, antepartum     labor in third trimester    Rh negative status during pregnancy in third trimester    Increased BMI     delivery delivered    Encounter for sterilization    Nausea and vomiting    Right upper quadrant abdominal pain    Left upper quadrant abdominal pain    Previous  delivery affecting pregnancy    Nausea and vomiting of pregnancy, antepartum    Hyperemesis complicating pregnancy, antepartum    Pregnancy       Start Time: 0156  Stop Time: 0232    Interpretation A  Nonstress Test Interpretation A: Reactive    RED Egan Rnc

## 2024-12-02 ENCOUNTER — ROUTINE PRENATAL (OUTPATIENT)
Dept: OBSTETRICS AND GYNECOLOGY | Age: 24
End: 2024-12-02
Payer: COMMERCIAL

## 2024-12-02 VITALS — DIASTOLIC BLOOD PRESSURE: 74 MMHG | WEIGHT: 227 LBS | BODY MASS INDEX: 44.33 KG/M2 | SYSTOLIC BLOOD PRESSURE: 116 MMHG

## 2024-12-02 DIAGNOSIS — Z98.891 PREVIOUS CESAREAN SECTION: ICD-10-CM

## 2024-12-02 DIAGNOSIS — Z13.1 SCREENING FOR DIABETES MELLITUS: ICD-10-CM

## 2024-12-02 DIAGNOSIS — Z34.92 PRENATAL CARE IN SECOND TRIMESTER: ICD-10-CM

## 2024-12-02 DIAGNOSIS — O34.219 PREVIOUS CESAREAN DELIVERY AFFECTING PREGNANCY: ICD-10-CM

## 2024-12-02 DIAGNOSIS — Z3A.27 27 WEEKS GESTATION OF PREGNANCY: Primary | ICD-10-CM

## 2024-12-02 DIAGNOSIS — Z13.0 SCREENING FOR IRON DEFICIENCY ANEMIA: ICD-10-CM

## 2024-12-02 DIAGNOSIS — Z21 CONGENITAL HIV INFECTION: ICD-10-CM

## 2024-12-02 LAB
GLUCOSE UR STRIP-MCNC: NEGATIVE MG/DL
PROT UR STRIP-MCNC: ABNORMAL MG/DL

## 2024-12-02 PROCEDURE — 99213 OFFICE O/P EST LOW 20 MIN: CPT | Performed by: OBSTETRICS & GYNECOLOGY

## 2024-12-02 RX ORDER — ACETAMINOPHEN 500 MG
1000 TABLET ORAL ONCE
OUTPATIENT
Start: 2024-12-02 | End: 2024-12-02

## 2024-12-02 RX ORDER — CARBOPROST TROMETHAMINE 250 UG/ML
250 INJECTION, SOLUTION INTRAMUSCULAR AS NEEDED
OUTPATIENT
Start: 2024-12-02

## 2024-12-02 RX ORDER — SODIUM CHLORIDE 9 MG/ML
40 INJECTION, SOLUTION INTRAVENOUS AS NEEDED
OUTPATIENT
Start: 2024-12-02

## 2024-12-02 RX ORDER — OXYTOCIN/0.9 % SODIUM CHLORIDE 30/500 ML
250 PLASTIC BAG, INJECTION (ML) INTRAVENOUS CONTINUOUS
OUTPATIENT
Start: 2024-12-02 | End: 2024-12-02

## 2024-12-02 RX ORDER — LIDOCAINE HYDROCHLORIDE 10 MG/ML
0.5 INJECTION, SOLUTION INFILTRATION; PERINEURAL ONCE AS NEEDED
OUTPATIENT
Start: 2024-12-02

## 2024-12-02 RX ORDER — KETOROLAC TROMETHAMINE 15 MG/ML
30 INJECTION, SOLUTION INTRAMUSCULAR; INTRAVENOUS ONCE
OUTPATIENT
Start: 2024-12-02 | End: 2024-12-02

## 2024-12-02 RX ORDER — SODIUM CHLORIDE 0.9 % (FLUSH) 0.9 %
10 SYRINGE (ML) INJECTION AS NEEDED
OUTPATIENT
Start: 2024-12-02

## 2024-12-02 RX ORDER — OXYTOCIN/0.9 % SODIUM CHLORIDE 30/500 ML
999 PLASTIC BAG, INJECTION (ML) INTRAVENOUS ONCE
OUTPATIENT
Start: 2024-12-02

## 2024-12-02 RX ORDER — MISOPROSTOL 100 UG/1
800 TABLET ORAL AS NEEDED
OUTPATIENT
Start: 2024-12-02

## 2024-12-02 RX ORDER — SODIUM CHLORIDE 0.9 % (FLUSH) 0.9 %
10 SYRINGE (ML) INJECTION EVERY 12 HOURS SCHEDULED
OUTPATIENT
Start: 2024-12-02

## 2024-12-02 RX ORDER — METHYLERGONOVINE MALEATE 0.2 MG/ML
200 INJECTION INTRAVENOUS ONCE AS NEEDED
OUTPATIENT
Start: 2024-12-02

## 2024-12-02 RX ORDER — TERCONAZOLE 4 MG/G
1 CREAM VAGINAL NIGHTLY
Qty: 45 G | Refills: 0 | Status: SHIPPED | OUTPATIENT
Start: 2024-12-02

## 2024-12-02 NOTE — PROGRESS NOTES
Patient is feeling okay  She is having leaking of fluid  On exam there is evidence of yeast vaginitis  There is no pool and the nitrazine was negative  She is having some contractions here and there  She also feels very tired  We discussed all these issues  Patient is taking her HIV medications  We will schedule her repeat  at 38 weeks  We will also be scheduling a salpingectomy  Patient is agreeable  Follow-up 2 weeks for growth scan    Chief Complaint   Patient presents with    Routine Prenatal Visit     Ob Check - Pt is 27w6d, Pt has not been checking blood sugars, Pt stating she spontaneously checked it after eating pizza & level was 165, Pt seen in L&D on 24 & 24 for possible contractions, Pt stating she believes she has been having spontaneous contractions but denies any consistent, Pt believes she lost amniotic fluid when going from sitting to standing Saturday, 1 hr gtt today       HPI:  Pt presents for routine prenatal visit    ROS:  No fever or chills, no nausea or vomiting, no contractions, no leg pain, no LE edema, no leaking fluid, no bleeding, no headache, no dysuria  All other systems reviewed and negative    Exam:  See flow sheet  General:  Alert and oriented and no distress  Neck: no lymphadenopathy or thyromegaly  Lungs: non - labored breathing  Abd:  See flow sheet, fundus nontender  Ext: see flow sheet, non-tender bilateral , no lesions  Neuro: grossly normal    Assessment:/ PLAN:    Diagnoses and all orders for this visit:    1. 27 weeks gestation of pregnancy (Primary)  -     Treponema pallidum AB w/Reflex RPR  -     POC Urinalysis Dipstick    2. Previous  section  -     Case Request; Standing  -     sodium chloride 0.9 % flush 10 mL  -     sodium chloride 0.9 % flush 10 mL  -     sodium chloride 0.9 % infusion 40 mL  -     lidocaine (XYLOCAINE) 1 % injection 0.5 mL  -     lactated ringers bolus 1,000 mL  -     acetaminophen (TYLENOL) tablet 1,000 mg  -      ceFAZolin (ANCEF) 2 g in sodium chloride 0.9 % 100 mL IVPB  -     oxytocin (PITOCIN) 30 units in 0.9% sodium chloride 500 mL (premix)  -     oxytocin (PITOCIN) 30 units in 0.9% sodium chloride 500 mL (premix)  -     ketorolac (TORADOL) injection 30 mg  -     methylergonovine (METHERGINE) injection 200 mcg  -     carboprost (HEMABATE) injection 250 mcg  -     miSOPROStol (CYTOTEC) tablet 800 mcg  -     Case Request    3. Congenital HIV infection  -     HIV-1 RNA, Quantitative, PCR (graph)    4. Previous  delivery affecting pregnancy    5. Prenatal care in second trimester    6. Screening for iron deficiency anemia  -     CBC (No Diff)    7. Screening for diabetes mellitus  -     Gestational Screen 1 Hr ([facility])    Other orders  -     Admit To Obstetrics Inpatient; Standing  -     Code Status and Medical Interventions: CPR (Attempt to Resuscitate); Full Support; Standing  -     Obtain Informed Consent; Standing  -     Vital Signs q 4 while awake; Standing  -     Vital Signs Per Hospital Policy; Standing  -     Continuous Fetal Monitoring With NST on Admission and Prior to Initiation of Oxytocin.; Standing  -     External Uterine Contraction Monitoring; Standing  -     Notify Provider (Specified); Standing  -     Notify Provider of Tachysystole (Per Hospital Algorithm); Standing  -     Notify Provider if Membranes Ruptured, Bleeding Greater Than 1 Pad Per Hour, Fetal Heart Tone Abnormality or Severe Pain; Standing  -     Initiate Group Beta Strep (GBS) Prophylaxis Protocol, If Criteria Met; Standing  -     Insert Indwelling Urinary Catheter; Standing  -     Assess Need for Indwelling Urinary Catheter - Follow Removal Protocol; Standing  -     Urinary Catheter Care; Standing  -     Abdominal Prep With Clippers; Standing  -     Chlorhexadine Skin Prep Unless Otherwise Indicated; Standing  -     SCD (Sequential Compression Devices); Standing  -     POC Glucose Once; Standing  -     Document Gatorade Consumption  Prior to Admission (Yes or No); Standing  -     NPO Diet NPO Type: Ice Chips; Standing  -     Inpatient Anesthesiology Consult; Standing  -     Type & Screen; Standing  -     CBC (No Diff); Standing  -     Treponema pallidum AB w/Reflex RPR; Standing  -     Insert Peripheral IV; Standing  -     Saline Lock & Maintain IV Access; Standing  -     Notify Provider (Specified); Standing  -     Vital Signs Per Hospital Policy; Standing  -     Strict Bed Rest; Standing  -     Fundal & Lochia Check; Standing  -     Fundal & Lochia Check; Standing  -     Diet: Regular/House; Fluid Consistency: Thin (IDDSI 0); Standing  -     terconazole (TERAZOL 7) 0.4 % vaginal cream; Insert 1 applicator into the vagina Every Night.  Dispense: 45 g; Refill: 0

## 2024-12-04 ENCOUNTER — TELEPHONE (OUTPATIENT)
Dept: OBSTETRICS AND GYNECOLOGY | Age: 24
End: 2024-12-04

## 2024-12-04 LAB
ERYTHROCYTE [DISTWIDTH] IN BLOOD BY AUTOMATED COUNT: 13.6 % (ref 11.7–15.4)
GLUCOSE 1H P 50 G GLC PO SERPL-MCNC: 134 MG/DL (ref 70–139)
HCT VFR BLD AUTO: 35.1 % (ref 34–46.6)
HGB BLD-MCNC: 11.6 G/DL (ref 11.1–15.9)
HIV1 RNA # SERPL NAA+PROBE: <20 COPIES/ML
HIV1 RNA SERPL NAA+PROBE-LOG#: NORMAL LOG10COPY/ML
MCH RBC QN AUTO: 30.4 PG (ref 26.6–33)
MCHC RBC AUTO-ENTMCNC: 33 G/DL (ref 31.5–35.7)
MCV RBC AUTO: 92 FL (ref 79–97)
PLATELET # BLD AUTO: 231 X10E3/UL (ref 150–450)
RBC # BLD AUTO: 3.82 X10E6/UL (ref 3.77–5.28)
TREPONEMA PALLIDUM IGG+IGM AB [PRESENCE] IN SERUM OR PLASMA BY IMMUNOASSAY: NON REACTIVE
WBC # BLD AUTO: 10.1 X10E3/UL (ref 3.4–10.8)

## 2024-12-04 RX ORDER — FLUCONAZOLE 150 MG/1
150 TABLET ORAL ONCE
Qty: 1 TABLET | Refills: 0 | Status: SHIPPED | OUTPATIENT
Start: 2024-12-04 | End: 2024-12-04

## 2024-12-04 NOTE — TELEPHONE ENCOUNTER
Spoke with pt. Pt stating she fell last night & did land on her stomach. Pt stating she has felt fetal movement today but stating it is not as much as normal. Pt stating she feels baby move 1-2 times an hour. Pt denies any vaginal bleeding or fluid loss. Pt stating she prefers to eat/hydrate & see how she is feeling after. I did advise pt to go to L&D since she had both the fall & decrease in fetal movement. Pt asked that message be sent instead. Please advise.

## 2024-12-04 NOTE — TELEPHONE ENCOUNTER
Spoke with pt & advised NP confirmed recommendation to proceed to L&D. I expressed the importance of going to L&D since she had both the fall & decrease in fetal movement. Pt declined going & states that she would rather keep an eye on symptoms & will go if anything changes. While on the phone with pt she stated she was prescribed terconazole cream for yeast infection on 12/2 & stating symptoms are not improving & that the cream burns. Pt asking if there is an alternate Rx that can be prescribed. Please advise.

## 2024-12-04 NOTE — TELEPHONE ENCOUNTER
Provider: DR AMNA MONTES    Caller: Negrita Silvestre    Relationship to Patient: Self      Phone Number: [phone] (home)       Reason for Call: PT FELL LAST NIGHT    When was the patient last seen: 12/2/24    When did it start: LAST NIGHT    Where is it located: ABDOMEN    Characteristics of symptom/severity: NOT FEELING AS MUCH MOVEMENT AS NORMAL    SPOKE WITH TRIAGE NURSE AND ADVISED FOR PT TO GO TO LABOR AND DELIVERY. RELAYED MESSAGE TO PT AND SHE WILL GO THERE NOW.

## 2024-12-09 ENCOUNTER — TELEPHONE (OUTPATIENT)
Dept: OBSTETRICS AND GYNECOLOGY | Age: 24
End: 2024-12-09

## 2024-12-09 NOTE — PROGRESS NOTES
Relayed result to pt. 3 hr gtt cancelled. Pt stating she still has all glucose supplies & will start checking blood sugars. Pt will bring log with her to next appt.

## 2024-12-09 NOTE — TELEPHONE ENCOUNTER
Returned pt call. Pt stating she has been vomiting x's 2 days & unable to keep anything down. Spoke with Dr. Daley regarding pt & offered pt zofran/phenergan. Pt stating she has taken both with no relief. Pt then advised to proceed to L&D for fluids. Pt stated her mother is not currently but will go to L&D later this evening once mother is able to take her. Pt stated understanding & has no further questions at this time.

## 2024-12-09 NOTE — TELEPHONE ENCOUNTER
Hub staff attempted to follow warm transfer process and was unsuccessful     Caller: Negrita Silvestre    Relationship to patient: Self    Best call back number: [phone]    Patient is needing: PT RETURNED CALL TO BABATUNDE - ONLY WANTED TO SPEAK WITH BABATUNDE, ASKED THAT WE HAVE BABATUNDE RETURN HER CALL    PLEASE CALL PT WHEN YOU CAN

## 2024-12-11 ENCOUNTER — HOSPITAL ENCOUNTER (EMERGENCY)
Facility: HOSPITAL | Age: 24
Discharge: HOME | End: 2024-12-11
Attending: OBSTETRICS & GYNECOLOGY | Admitting: OBSTETRICS & GYNECOLOGY
Payer: COMMERCIAL

## 2024-12-11 VITALS
HEART RATE: 88 BPM | RESPIRATION RATE: 16 BRPM | OXYGEN SATURATION: 99 % | DIASTOLIC BLOOD PRESSURE: 65 MMHG | BODY MASS INDEX: 45.43 KG/M2 | WEIGHT: 232.6 LBS | TEMPERATURE: 97.8 F | SYSTOLIC BLOOD PRESSURE: 115 MMHG

## 2024-12-11 LAB
BACTERIA UR QL AUTO: ABNORMAL /HPF
BILIRUB UR QL STRIP: NEGATIVE
CLARITY UR: ABNORMAL
COLOR UR: ABNORMAL
GLUCOSE UR STRIP-MCNC: NEGATIVE MG/DL
HGB UR QL STRIP.AUTO: NEGATIVE
HYALINE CASTS UR QL AUTO: ABNORMAL /LPF
KETONES UR QL STRIP: ABNORMAL
LEUKOCYTE ESTERASE UR QL STRIP.AUTO: ABNORMAL
NITRITE UR QL STRIP: NEGATIVE
PH UR STRIP.AUTO: 6 [PH] (ref 5–8)
PROT UR QL STRIP: ABNORMAL
RBC # UR STRIP: ABNORMAL /HPF
REF LAB TEST METHOD: ABNORMAL
SP GR UR STRIP: >1.03 (ref 1–1.03)
SQUAMOUS #/AREA URNS HPF: ABNORMAL /HPF
UROBILINOGEN UR QL STRIP: ABNORMAL
WBC # UR STRIP: ABNORMAL /HPF

## 2024-12-11 PROCEDURE — 87086 URINE CULTURE/COLONY COUNT: CPT | Performed by: OBSTETRICS & GYNECOLOGY

## 2024-12-11 PROCEDURE — 59025 FETAL NON-STRESS TEST: CPT

## 2024-12-11 PROCEDURE — 81001 URINALYSIS AUTO W/SCOPE: CPT | Performed by: OBSTETRICS & GYNECOLOGY

## 2024-12-11 PROCEDURE — 99284 EMERGENCY DEPT VISIT MOD MDM: CPT | Performed by: OBSTETRICS & GYNECOLOGY

## 2024-12-11 NOTE — Clinical Note
December 11, 2024     Patient: Negrita Silvestre   YOB: 2000   Date of Visit: 12/2/2024       To Whom It May Concern:    It is my medical opinion that Negrita Silvestre {Work release (duty restriction):81326}.           Sincerely,        No name on file    CC: No Recipients

## 2024-12-12 NOTE — OBED NOTES
JOANNA Note OB    Patient Name: Negrita Silvestre  YOB: 2000  MRN: [id number]  Admission Date: 2024  6:50 PM  Date of Service: 2024    Chief Complaint: cramping        Subjective     Negrita Silvestre is a 24 y.o. female  at 29w1d with Estimated Date of Delivery: 25 who presents with the chief complaint listed above.  Patient reports she has been cramping on and off all week long but it got worse today.  She says she has been having cramps every 5 minutes and ranking them as significant.  She reports sometimes are so uncomfortable that she gets nauseated with them.     She sees Nancy Daley MD for her prenatal care. Her pregnancy has been complicated by: Rh-, anemia, prior , history of congenital HIV, gestational diabetes, morbid obesity, depression anxiety, Asperger    She describes fetal movement as normal.  She denies rupture of membranes.  She denies vaginal bleeding. She is feeling contractions.        Objective   Patient Active Problem List    Diagnosis     *Previous  section [Z98.891]     Hyperemesis complicating pregnancy, antepartum [O21.0]     Pregnancy [Z34.90]     Previous  delivery affecting pregnancy [O34.219]     Nausea and vomiting of pregnancy, antepartum [O21.9]     Nausea and vomiting [R11.2]     Right upper quadrant abdominal pain [R10.11]     Left upper quadrant abdominal pain [R10.12]      delivery delivered [O82]     Encounter for sterilization [Z30.2]      labor in third trimester [O60.03]     Rh negative status during pregnancy in third trimester [O26.893, Z67.91]     Increased BMI [R63.8]     Maternal anemia in pregnancy, antepartum [O99.019]     Anxiety in pregnancy, antepartum [O99.340, F41.9]     Congenital HIV infection [Z21]     Diet controlled gestational diabetes mellitus (GDM) in third trimester [O24.410]         OB History    Para Term  AB Living   2 1 1 0 0 1   SAB IAB  Ectopic Molar Multiple Live Births   0 0 0 0 0 1      # Outcome Date GA Lbr Michael/2nd Weight Sex Type Anes PTL Lv   2 Current            1 Term 02/10/24 38w0d  3960 g (8 lb 11.7 oz) F CS-LTranv Spinal N BAKARI      Birth Comments: panda or 1      Name: Deirdre Miranda      Apgar1: 8  Apgar5: 9        Past Medical History:   Diagnosis Date    Anxiety     on Zoloft    Asperger's disorder     Depression     Gestational diabetes     diet controlled    HIV (human immunodeficiency virus infection)     congential, on meds    [place] teeth extracted        Past Surgical History:   Procedure Laterality Date     SECTION WITH TUBAL N/A 2/10/2024    Procedure:  SECTION PRIMARY WITH TUBAL;  Surgeon: Nancy Sheth MD;  Location: [facility] LABOR DELIVERY;  Service: Obstetrics/Gynecology;  Laterality: N/A;    ENDOSCOPY N/A 2024    Procedure: ESOPHAGOGASTRODUODENOSCOPY WITH BIOPSIES;  Surgeon: Josue Wright MD;  [place] [facility] [place];  Service: Gastroenterology;  Laterality: N/A;  esophagitis    EYE SURGERY         No current facility-administered medications on file prior to encounter.     Current Outpatient Medications on File Prior to Encounter   Medication Sig Dispense Refill    Emtricitabine-Tenofovir AF (Descovy) 200-25 MG per tablet Take 1 tablet by mouth Daily for 90 days. 30 tablet 2    ondansetron ODT (ZOFRAN-ODT) 8 MG disintegrating tablet Place 1 tablet on the tongue Every 12 (Twelve) Hours As Needed for Nausea or Vomiting. 30 tablet 1    pantoprazole (PROTONIX) 40 MG EC tablet Take 1 tablet by mouth Daily. 90 tablet 3    Prenatal Vit-Fe Fumarate-FA (Prenatal Vitamin) 27-0.8 MG tablet Take 1 tablet by mouth Daily. 30 tablet 12    Prezista 600 MG tablet TAKE 1 TABLET BY MOUTH TWICE DAILY WITH MEALS 60 tablet 2    promethazine (PHENERGAN) 12.5 MG tablet Take 1 tablet by mouth Every 6 (Six) Hours As Needed for Nausea or Vomiting. 30 tablet 1    ritonavir (NORVIR) 100 MG tablet Take 1  tablet by mouth 2 (Two) Times a Day for 90 days. 60 tablet 2    sertraline (Zoloft) 100 MG tablet Take 2 tablets by mouth Daily for 364 days. 180 tablet 3    terconazole (TERAZOL 7) 0.4 % vaginal cream Insert 1 applicator into the vagina Every Night. 45 g 0       Allergies   Allergen Reactions    Bactrim [Sulfamethoxazole-Trimethoprim] Hives    Sulfa Antibiotics Hives    Pyrites Rash       Family History   Adopted: Yes   Problem Relation Age of Onset    Colon cancer Neg Hx     Colon polyps Neg Hx     Crohn's disease Neg Hx     Irritable bowel syndrome Neg Hx     Ulcerative colitis Neg Hx        Social History     Socioeconomic History    Marital status:    Tobacco Use    Smoking status: Every Day     Current packs/day: 0.25     Average packs/day: 0.3 packs/day for 5.9 years (1.5 ttl pk-yrs)     Types: Cigarettes     Start date: 2019     Passive exposure: Never    Smokeless tobacco: Never   Vaping Use    Vaping status: Some Days    Last attempt to quit: 5/1/2023    Substances: Nicotine   Substance and Sexual Activity    Alcohol use: Not Currently    Drug use: Not Currently    Sexual activity: Yes     Partners: Male     Birth control/protection: None           Review of Systems   Constitutional:  Negative for chills and fever.   HENT: Negative.     Eyes:  Negative for photophobia and visual disturbance.   Respiratory:  Negative for shortness of breath.    Cardiovascular:  Negative for chest pain.   Gastrointestinal:  Positive for abdominal pain. Negative for nausea.   Psychiatric/Behavioral:  The patient is not nervous/anxious.           PHYSICAL EXAM:      VITAL SIGNS:  Vitals:    12/11/24 1854   Weight: 106 kg (232 lb 9.6 oz)            FHT'S:    150 with moderate variability and accelerations                                     PHYSICAL EXAM:      General: well developed; well nourished  no acute distress  mentation appropriate   Heart: Not performed.   Lungs   breathing is unlabored   Abdomen: Gravid and  non tender     Extremities: trace edema, DTRs 1 plus, no clonus       Cervix: Per RN        Contractions:   Scant uterine irritability                    LABS AND TESTING ORDERED:  Uterine and fetal monitoring  Urinalysis  Serial cervical exams    LAB RESULTS:    No results found for this or any previous visit (from the past 24 hours).    Lab Results   Component Value Date    ABO A 07/11/2024    RH Negative 07/11/2024       Lab Results   Component Value Date    STREPGPB Negative 01/25/2024                 External Prenatal Results       Pregnancy Outside Results - Transcribed From Office Records - See Scanned Records For Details       Test Value Date Time    ABO  A  07/11/24 1032    Rh  Negative  07/11/24 1032    Antibody Screen  Negative  07/11/24 1032    Varicella IgG       Rubella  1.33 index 07/11/24 1032    Hgb  11.6 g/dL 12/02/24 1240       10.8 g/dL 11/04/24 1734       11.3 g/dL 10/18/24 1355       13.9 g/dL 07/11/24 1032       12.9 g/dL 06/26/24 1135    Hct  35.1 % 12/02/24 1240       30.4 % 11/04/24 1734       33.2 % 10/18/24 1355       38.5 % 07/11/24 1032       39.2 % 06/26/24 1135    HgB A1c        1h GTT  134 mg/dL 12/02/24 1240    3h GTT Fasting       3h GTT 1 hour       3h GTT 2 hour       3h GTT 3 hour        Gonorrhea (discrete)  Negative  08/08/24 1641       Negative  07/11/24 1059    Chlamydia (discrete)  Negative  08/08/24 1641       Negative  07/11/24 1059    RPR  Non Reactive  07/11/24 1032    Syphils cascade: TP-Ab (FTA)  Non Reactive  12/02/24 1240    TP-Ab  Non Reactive  12/02/24 1240    TP-Ab (EIA)       TPPA       HBsAg  Negative  07/11/24 1032    Herpes Simplex Virus PCR       Herpes Simplex VIrus Culture       HIV  Preliminary Reactive  07/11/24 1032    Hep C RNA Quant PCR       Hep C Antibody  Non Reactive  07/11/24 1032    AFP  16.7 ng/mL 09/05/24 1046    NIPT       Cystic Fibrosis ([place])       Cystic Fibroisis        Spinal Muscular atrophy  Positive  08/14/23 0857    Fragile X        Group B Strep       GBS Susceptibility to Clindamycin       GBS Susceptibility to Erythromycin       Fetal Fibronectin       Genetic Testing, Maternal Blood                 Drug Screening       Test Value Date Time    Urine Drug Screen       Amphetamine Screen       Barbiturate Screen       Benzodiazepine Screen       Methadone Screen       Phencyclidine Screen       Opiates Screen       THC Screen       Cocaine Screen       Propoxyphene Screen       Buprenorphine Screen       Methamphetamine Screen       Oxycodone Screen       Tricyclic Antidepressants Screen                 Legend    ^: Historical                              Impression:   @ 29w1d .  Final Diagnosis: Perrydarlene Bueno    Plan:  1. Discharge to home.    2. Plan of care has been reviewed with patient along with risks, benefits of treatment.   All questions have been answered. Call health care provider for any further concerns and keep office appointments.  3.  Comfort measures,  labor precautions      I discussed the patients findings and my recommendations with patient, family, and nursing staff      Belkys Hall MD  2024  19:36 EST

## 2024-12-13 LAB — BACTERIA SPEC AEROBE CULT: NORMAL

## 2024-12-13 NOTE — PROGRESS NOTES
Phoned pt & lvm of negative results. Advised pt to contact our office with any questions or concerns.

## 2024-12-19 ENCOUNTER — ROUTINE PRENATAL (OUTPATIENT)
Dept: OBSTETRICS AND GYNECOLOGY | Age: 24
End: 2024-12-19
Payer: COMMERCIAL

## 2024-12-19 VITALS — BODY MASS INDEX: 45.5 KG/M2 | WEIGHT: 233 LBS | SYSTOLIC BLOOD PRESSURE: 124 MMHG | DIASTOLIC BLOOD PRESSURE: 74 MMHG

## 2024-12-19 DIAGNOSIS — O36.60X0 EXCESSIVE FETAL GROWTH AFFECTING MANAGEMENT OF PREGNANCY, ANTEPARTUM, SINGLE OR UNSPECIFIED FETUS: ICD-10-CM

## 2024-12-19 DIAGNOSIS — R10.11 RIGHT UPPER QUADRANT PAIN: ICD-10-CM

## 2024-12-19 DIAGNOSIS — R63.8 INCREASED BMI: ICD-10-CM

## 2024-12-19 DIAGNOSIS — R11.2 NAUSEA AND VOMITING, UNSPECIFIED VOMITING TYPE: ICD-10-CM

## 2024-12-19 DIAGNOSIS — Z21 CONGENITAL HIV INFECTION: ICD-10-CM

## 2024-12-19 DIAGNOSIS — O99.019 MATERNAL ANEMIA IN PREGNANCY, ANTEPARTUM: ICD-10-CM

## 2024-12-19 DIAGNOSIS — O24.410 DIET CONTROLLED GESTATIONAL DIABETES MELLITUS (GDM) IN THIRD TRIMESTER: ICD-10-CM

## 2024-12-19 DIAGNOSIS — Z67.91 RH NEGATIVE STATUS DURING PREGNANCY IN THIRD TRIMESTER: ICD-10-CM

## 2024-12-19 DIAGNOSIS — O99.340 ANXIETY IN PREGNANCY, ANTEPARTUM: ICD-10-CM

## 2024-12-19 DIAGNOSIS — O34.219 PREVIOUS CESAREAN DELIVERY AFFECTING PREGNANCY: ICD-10-CM

## 2024-12-19 DIAGNOSIS — O26.893 RH NEGATIVE STATUS DURING PREGNANCY IN THIRD TRIMESTER: ICD-10-CM

## 2024-12-19 DIAGNOSIS — Z3A.30 30 WEEKS GESTATION OF PREGNANCY: Primary | ICD-10-CM

## 2024-12-19 DIAGNOSIS — R19.7 DIARRHEA, UNSPECIFIED TYPE: ICD-10-CM

## 2024-12-19 DIAGNOSIS — F41.9 ANXIETY IN PREGNANCY, ANTEPARTUM: ICD-10-CM

## 2024-12-19 LAB
GLUCOSE UR STRIP-MCNC: NEGATIVE MG/DL
PROT UR STRIP-MCNC: ABNORMAL MG/DL

## 2024-12-19 RX ORDER — ONDANSETRON 8 MG/1
8 TABLET, ORALLY DISINTEGRATING ORAL EVERY 12 HOURS PRN
Qty: 30 TABLET | Refills: 1 | Status: SHIPPED | OUTPATIENT
Start: 2024-12-19

## 2024-12-19 NOTE — PROGRESS NOTES
Chief Complaint   Patient presents with    Routine Prenatal Visit     30 week ob visit with growth scan       HPI: 24 y.o.  at 30w2d gestation  Negrita is seen today for an u/s and belly check  She was diagnosed with GDM based off an elevated 1 hour glucose tolerance test and h/o GDM  She is checking her BS but does not have her log  Her mom is with her today and says her numbers have been wnl  I have asked them to send in their log by email  She notes nausea and diarrhea  Mom attributes this to greasy foods she eats  She also notes ruq pain  She appears in NAD today  Requests refill of zofran  I'll check cmp and lipase today as well  She is rh negative, will given rhogam today after antibody test  EFW done today=EFW:96%ile, AC:93%ile   LYNDSEY slightly high at 22 cm  She has scheduled f/u on the  with Dr Daley  Plan to start BPP's at 34 wks.    Vitals:    24 0956   BP: 124/74   Weight: 106 kg (233 lb)       ROS:  GI:  Negative  : na  Pulmonary: Negative     A/P  1. Intrauterine pregnancy at 30w2d   2. Pregnancy Risk:  HIGH RISK    Diagnoses and all orders for this visit:    1. 30 weeks gestation of pregnancy (Primary)  -     POC Urinalysis Dipstick, Multipro    2. Previous  delivery affecting pregnancy    3. Maternal anemia in pregnancy, antepartum    4. Congenital HIV infection    5. Increased BMI    6. Rh negative status during pregnancy in third trimester  Comments:  rhogam given today  Orders:  -     Antibody Screen    7. Anxiety in pregnancy, antepartum    8. Right upper quadrant pain  Comments:  ordered labs  change diet    9. Nausea and vomiting, unspecified vomiting type  Comments:  refilled zofran  Orders:  -     Comprehensive Metabolic Panel  -     Amylase  -     Lipase    10. Diarrhea, unspecified type  -     Comprehensive Metabolic Panel  -     Amylase  -     Lipase    11. Diet controlled gestational diabetes mellitus (GDM) in third trimester  Comments:  did not have BS log,  advised to email it    12. Excessive fetal growth affecting management of pregnancy, antepartum, single or unspecified fetus    Other orders  -     ondansetron ODT (ZOFRAN-ODT) 8 MG disintegrating tablet; Place 1 tablet on the tongue Every 12 (Twelve) Hours As Needed for Nausea or Vomiting.  Dispense: 30 tablet; Refill: 1  -     Rhogam Immune Globulin Immunization        -----------------------  PLAN:   Return in about 2 weeks (around 1/2/2025) for start BPP's at 34 wks.      SUELLEN Kramer  12/19/2024 13:45 EST

## 2024-12-20 ENCOUNTER — TELEPHONE (OUTPATIENT)
Dept: OBSTETRICS AND GYNECOLOGY | Age: 24
End: 2024-12-20

## 2024-12-20 ENCOUNTER — HOSPITAL ENCOUNTER (EMERGENCY)
Facility: HOSPITAL | Age: 24
Discharge: HOME | End: 2024-12-20
Attending: OBSTETRICS & GYNECOLOGY | Admitting: OBSTETRICS & GYNECOLOGY
Payer: COMMERCIAL

## 2024-12-20 VITALS
TEMPERATURE: 99.2 F | SYSTOLIC BLOOD PRESSURE: 110 MMHG | DIASTOLIC BLOOD PRESSURE: 60 MMHG | BODY MASS INDEX: 45.5 KG/M2 | RESPIRATION RATE: 17 BRPM | HEIGHT: 60 IN | OXYGEN SATURATION: 96 % | HEART RATE: 90 BPM

## 2024-12-20 LAB
A1 MICROGLOB PLACENTAL VAG QL: NEGATIVE
ALBUMIN SERPL-MCNC: 3.4 G/DL (ref 3.5–5.2)
ALBUMIN/GLOB SERPL: 1.2 G/DL
ALP SERPL-CCNC: 92 U/L (ref 39–117)
ALT SERPL-CCNC: 6 U/L (ref 1–33)
AMYLASE SERPL-CCNC: 33 U/L (ref 28–100)
AST SERPL-CCNC: 9 U/L (ref 1–32)
BILIRUB SERPL-MCNC: <0.2 MG/DL (ref 0–1.2)
BILIRUB UR QL STRIP: NEGATIVE
BLD GP AB SCN SERPL QL: NEGATIVE
BLOODY SPECIMEN?: NO
BUN SERPL-MCNC: 6 MG/DL (ref 6–20)
BUN/CREAT SERPL: 15.8 (ref 7–25)
CALCIUM SERPL-MCNC: 8.6 MG/DL (ref 8.6–10.5)
CHLORIDE SERPL-SCNC: 103 MMOL/L (ref 98–107)
CLARITY UR: CLEAR
CO2 SERPL-SCNC: 20.8 MMOL/L (ref 22–29)
COLOR UR: YELLOW
CREAT SERPL-MCNC: 0.38 MG/DL (ref 0.57–1)
EGFRCR SERPLBLD CKD-EPI 2021: 142.8 ML/MIN/1.73
FIBRONECTIN FETAL VAG QL: NEGATIVE
GLOBULIN SER CALC-MCNC: 2.8 GM/DL
GLUCOSE SERPL-MCNC: 119 MG/DL (ref 65–99)
GLUCOSE UR STRIP-MCNC: NEGATIVE MG/DL
HGB UR QL STRIP.AUTO: NEGATIVE
KETONES UR QL STRIP: NEGATIVE
LEUKOCYTE ESTERASE UR QL STRIP.AUTO: NEGATIVE
LIPASE SERPL-CCNC: 18 U/L (ref 13–60)
NITRITE UR QL STRIP: NEGATIVE
PH UR STRIP.AUTO: 6 [PH] (ref 5–8)
POTASSIUM SERPL-SCNC: 3.7 MMOL/L (ref 3.5–5.2)
PROT SERPL-MCNC: 6.2 G/DL (ref 6–8.5)
PROT UR QL STRIP: NEGATIVE
SODIUM SERPL-SCNC: 136 MMOL/L (ref 136–145)
SP GR UR STRIP: 1.01 (ref 1–1.03)
UROBILINOGEN UR QL STRIP: NORMAL

## 2024-12-20 PROCEDURE — 84112 EVAL AMNIOTIC FLUID PROTEIN: CPT | Performed by: OBSTETRICS & GYNECOLOGY

## 2024-12-20 PROCEDURE — 59025 FETAL NON-STRESS TEST: CPT

## 2024-12-20 PROCEDURE — 82731 ASSAY OF FETAL FIBRONECTIN: CPT | Performed by: OBSTETRICS & GYNECOLOGY

## 2024-12-20 PROCEDURE — 99284 EMERGENCY DEPT VISIT MOD MDM: CPT | Performed by: OBSTETRICS & GYNECOLOGY

## 2024-12-20 PROCEDURE — 81003 URINALYSIS AUTO W/O SCOPE: CPT | Performed by: OBSTETRICS & GYNECOLOGY

## 2024-12-20 RX ORDER — EMTRICITABINE AND TENOFOVIR ALAFENAMIDE 200; 25 MG/1; MG/1
1 TABLET ORAL DAILY
COMMUNITY
Start: 2024-12-02

## 2024-12-20 RX ORDER — RITONAVIR 100 MG/1
1 TABLET ORAL EVERY 12 HOURS SCHEDULED
COMMUNITY
Start: 2024-12-02

## 2024-12-20 NOTE — OBED NOTES
[facility]  Negrita Silvestre  : 2000  MRN: [id number]  CSN: [id number]    OB ED Provider Note    Subjective   Chief Complaint   Patient presents with    Contractions     JOANNA- at 30+3wks arrives from home with c/o irregular ctxs since 0400 this AM. Pt reports +FM and denies VB. Pt repots LOF since 0300 this AM. Abdomen palpates soft     Negrita Silvestre is a 24 y.o. year old  with an Estimated Date of Delivery: 25 currently at 30w3d presenting with possible ROM with CTX. She reports one gush of fluid while trying to get to the bathroom and another when getting up from the couch. She has had irregular CTX since then. She denies VB. FM is noted.    Prenatal care has been with Dr. Daley.  It has been complicated by congenital HIV, previous  for repeat, failed BTL.  .    OB History    Para Term  AB Living   2 1 1 0 0 1   SAB IAB Ectopic Molar Multiple Live Births   0 0 0 0 0 1      # Outcome Date GA Lbr Michael/2nd Weight Sex Type Anes PTL Lv   2 Current            1 Term 02/10/24 38w0d  3960 g (8 lb 11.7 oz) F CS-LTranv Spinal N BAKARI      Birth Comments: panda or 1      Name: Deirdre Miranda      Apgar1: 8  Apgar5: 9     Past Medical History:   Diagnosis Date    Anxiety     on Zoloft    Asperger's disorder     Depression     Gestational diabetes     diet controlled    HIV (human immunodeficiency virus infection)     congential, on meds    [place] teeth extracted      Past Surgical History:   Procedure Laterality Date    EYE SURGERY       SECTION WITH TUBAL N/A 2/10/2024    Procedure:  SECTION PRIMARY WITH TUBAL;  Surgeon: Nancy Sheth MD;  Location: [facility] LABOR DELIVERY;  Service: Obstetrics/Gynecology;  Laterality: N/A;    ENDOSCOPY N/A 2024    Procedure: ESOPHAGOGASTRODUODENOSCOPY WITH BIOPSIES;  Surgeon: Josue Wright MD;  [place] [facility] [place];  Service: Gastroenterology;  Laterality: N/A;  esophagitis  "    No current facility-administered medications for this encounter.    Allergies   Allergen Reactions    Bactrim [Sulfamethoxazole-Trimethoprim] Hives    Sulfa Antibiotics Hives    Strawberry Rash     Social History    Tobacco Use      Smoking status: Every Day        Packs/day: 0.25        Years: 0.3 packs/day for 6.0 years (1.5 ttl pk-yrs)        Types: Cigarettes        Start date: 2019        Passive exposure: Never      Smokeless tobacco: Never    Review of Systems   Gastrointestinal:  Positive for abdominal pain.   Genitourinary:  Positive for vaginal discharge.   All other systems reviewed and are negative.        Objective   /60   Pulse 90   Temp 99.2 °F (37.3 °C) (Oral)   Resp 17   Ht 152.4 cm (60\")   LMP 05/28/2024 (Approximate)   SpO2 96%   BMI 45.50 kg/m²   General: well developed; well nourished  no acute distress  mentation appropriate   Abdomen: soft, non-tender; no masses  gravid    FHT's: reactive and category 1      Cervix: Was checked by RN, 0/30/-2   Presentation: cephalic   Contractions: irregular   Chest: Unlabored respirations    CV:  RRR   Ext:   No C/C/E   Back: CVA tenderness is deferred bilateral        Prenatal Labs  Lab Results   Component Value Date    HGB 11.6 12/02/2024    RUBELLAABIGG 1.33 07/11/2024    HEPBSAG Negative 07/11/2024    ABSCRN Negative 12/19/2024    [id number] Preliminary Reactive 07/11/2024    HEPCVIRUSABY Non Reactive 07/11/2024     12/02/2024    STREPGPB Negative 01/25/2024    URINECX >100,000 CFU/mL Mixed Raisa Isolated 12/11/2024    CHLAMNAA Negative 08/08/2024    NGONORRHON Negative 08/08/2024       Current Labs Reviewed   UA:    Lab Results   Component Value Date    SQUAMEPIUA 31-50 (A) 12/11/2024    SPECGRAVUR 1.010 12/20/2024    KETONESU Negative 12/20/2024    BLOODU Negative 12/20/2024    LEUKOCYTESUR Negative 12/20/2024    NITRITEU Negative 12/20/2024    RBCUA 0-2 12/11/2024    WBCUA 6-10 (A) 12/11/2024    BACTERIA 3+ (A) 12/11/2024     fFN " negative  ROM+ negative     Assessment   IUP at 30w3d  No evidence of PPROM     Plan   D/C home with instructions to return for worsening symptoms, acute changes. Keep scheduled appointments.     Jane Tilley MD  12/20/2024  14:45 EST

## 2024-12-20 NOTE — TELEPHONE ENCOUNTER
Pt 30w3d calling in stating she been having contraction since 4am they are 2min apart,I advice pt to go to labor and delivery and pt states she has to wait to her mother get back from her doctors appt she wont be home till 11am I ask pt does she has someone else to take her she states no I advice her to call [phone]

## 2024-12-20 NOTE — NON STRESS TEST
Negrita Silvestre, a  at 30w3d with an BRIGID of 2025, by Other Basis, was seen at [facility] OBSTETRIC EMERGENCY DEPARTMENT for a nonstress test.    Chief Complaint   Patient presents with    Contractions     JOANNA- at 30+3wks arrives from home with c/o irregular ctxs since 0400 this AM. Pt reports +FM and denies VB. Pt repots LOF since 0300 this AM. Abdomen palpates soft       Patient Active Problem List   Diagnosis    Congenital HIV infection    Diet controlled gestational diabetes mellitus (GDM) in third trimester    Maternal anemia in pregnancy, antepartum    Anxiety in pregnancy, antepartum     labor in third trimester    Rh negative status during pregnancy in third trimester    Increased BMI     delivery delivered    Encounter for sterilization    Nausea and vomiting    Right upper quadrant abdominal pain    Left upper quadrant abdominal pain    Previous  delivery affecting pregnancy    Nausea and vomiting of pregnancy, antepartum    Hyperemesis complicating pregnancy, antepartum    Pregnancy    Previous  section       Start Time: 1228  Stop Time: 1345    Interpretation A  Nonstress Test Interpretation A: Reactive                 
negative...

## 2024-12-28 ENCOUNTER — HOSPITAL ENCOUNTER (EMERGENCY)
Facility: HOSPITAL | Age: 24
Discharge: HOME | End: 2024-12-28
Attending: OBSTETRICS & GYNECOLOGY | Admitting: OBSTETRICS & GYNECOLOGY
Payer: COMMERCIAL

## 2024-12-28 VITALS
TEMPERATURE: 98 F | DIASTOLIC BLOOD PRESSURE: 67 MMHG | HEART RATE: 107 BPM | OXYGEN SATURATION: 96 % | HEIGHT: 60 IN | SYSTOLIC BLOOD PRESSURE: 117 MMHG | RESPIRATION RATE: 16 BRPM | BODY MASS INDEX: 45.5 KG/M2

## 2024-12-28 LAB
A1 MICROGLOB PLACENTAL VAG QL: NEGATIVE
BILIRUB UR QL STRIP: NEGATIVE
CLARITY UR: ABNORMAL
COLOR UR: YELLOW
GLUCOSE UR STRIP-MCNC: ABNORMAL MG/DL
HGB UR QL STRIP.AUTO: NEGATIVE
KETONES UR QL STRIP: ABNORMAL
LEUKOCYTE ESTERASE UR QL STRIP.AUTO: NEGATIVE
NITRITE UR QL STRIP: NEGATIVE
PH UR STRIP.AUTO: 6.5 [PH] (ref 5–8)
PROT UR QL STRIP: NEGATIVE
SP GR UR STRIP: 1.03 (ref 1–1.03)
UROBILINOGEN UR QL STRIP: ABNORMAL

## 2024-12-28 PROCEDURE — 59025 FETAL NON-STRESS TEST: CPT | Performed by: OBSTETRICS & GYNECOLOGY

## 2024-12-28 PROCEDURE — 59025 FETAL NON-STRESS TEST: CPT

## 2024-12-28 PROCEDURE — 81003 URINALYSIS AUTO W/O SCOPE: CPT | Performed by: OBSTETRICS & GYNECOLOGY

## 2024-12-28 PROCEDURE — 99284 EMERGENCY DEPT VISIT MOD MDM: CPT | Performed by: OBSTETRICS & GYNECOLOGY

## 2024-12-28 PROCEDURE — 87086 URINE CULTURE/COLONY COUNT: CPT | Performed by: OBSTETRICS & GYNECOLOGY

## 2024-12-28 PROCEDURE — 84112 EVAL AMNIOTIC FLUID PROTEIN: CPT | Performed by: OBSTETRICS & GYNECOLOGY

## 2024-12-28 RX ORDER — ACETAMINOPHEN 500 MG
1000 TABLET ORAL ONCE
Status: COMPLETED | OUTPATIENT
Start: 2024-12-28 | End: 2024-12-28

## 2024-12-28 RX ADMIN — ACETAMINOPHEN 1000 MG: 500 TABLET, FILM COATED ORAL at 19:18

## 2024-12-28 NOTE — OBED NOTES
[facility]  Negrita Silvestre  : 2000  MRN: [id number]  CSN: [id number]    OB ED Provider Note    Subjective   No chief complaint on file.    Negrita Silvestre is a 24 y.o. year old  with an Estimated Date of Delivery: 25 currently at 31w4d presenting with pelvic pain, inability to get comfortable and sleep. She reports some pain on both sides of lower pelvis. The pain is constant, is taking Tylenol PRN. She also reports leaking urine, denies dysuria or urgency. She denies leaking or bleeding. She endorses good fetal movement.    Prenatal care has been with Dr. Daley.  It has been complicated by HIV, GDM, h/o C/S, anxiety/depression, morbid obesity.    OB History    Para Term  AB Living   2 1 1 0 0 1   SAB IAB Ectopic Molar Multiple Live Births   0 0 0 0 0 1      # Outcome Date GA Lbr Michael/2nd Weight Sex Type Anes PTL Lv   2 Current            1 Term 02/10/24 38w0d  3960 g (8 lb 11.7 oz) F CS-LTranv Spinal N BAKARI      Birth Comments: panda or 1      Name: Deirdre Miranda      Apgar1: 8  Apgar5: 9     Past Medical History:   Diagnosis Date    Anxiety     on Zoloft    Asperger's disorder     Depression     Gestational diabetes     diet controlled    HIV (human immunodeficiency virus infection)     congential, on meds    [place] teeth extracted      Past Surgical History:   Procedure Laterality Date    EYE SURGERY       SECTION WITH TUBAL N/A 2/10/2024    Procedure:  SECTION PRIMARY WITH TUBAL;  Surgeon: Nancy Sheth MD;  Location:  [facility] LABOR DELIVERY;  Service: Obstetrics/Gynecology;  Laterality: N/A;    ENDOSCOPY N/A 2024    Procedure: ESOPHAGOGASTRODUODENOSCOPY WITH BIOPSIES;  Surgeon: Josue Wright MD;  [place] [facility] [place];  Service: Gastroenterology;  Laterality: N/A;  esophagitis     No current facility-administered medications for this encounter.    Allergies   Allergen Reactions    Bactrim  [Sulfamethoxazole-Trimethoprim] Hives    Sulfa Antibiotics Hives    Strawberry Rash     Social History    Tobacco Use      Smoking status: Every Day        Packs/day: 0.25        Years: 0.3 packs/day for 6.0 years (1.5 ttl pk-yrs)        Types: Cigarettes        Start date: 2019        Passive exposure: Never      Smokeless tobacco: Never    Review of Systems  +pelvic pain, +urinary incontinence      Objective   LMP 05/28/2024 (Approximate)   General: well developed; well nourished  no acute distress  mentation appropriate   Abdomen: soft, non-tender; no masses  gravid    FHT's: NST reactive, 140, moderate, +accels, no decels      Cervix: was checked (by RN): 0 cm / 0 % / -3       Contractions: none   Chest: Unlabored respirations    CV:  normal rate, regular rhythm,  no murmurs, rubs, or gallops   Ext:   No C/C/E   Back: CVA tenderness is absent bilateral        Prenatal Labs  Lab Results   Component Value Date    HGB 11.6 12/02/2024    RUBELLAABIGG 1.33 07/11/2024    HEPBSAG Negative 07/11/2024    ABSCRN Negative 12/19/2024    [id number] Preliminary Reactive 07/11/2024    HEPCVIRUSABY Non Reactive 07/11/2024     12/02/2024    STREPGPB Negative 01/25/2024    URINECX >100,000 CFU/mL Mixed Raisa Isolated 12/11/2024    CHLAMNAA Negative 08/08/2024    NGONORRHON Negative 08/08/2024        Assessment and Plan  IUP at 31w4d with pelvic pain  VSS, afebrile, exam benign, SVE closed  UA concentrated but not indicative of UTI  Fetal status reassuring, NST reactive  Recommend Tylenol PRN, maternity support belt, discussed safe meds to help with sleeping       Aleja Mendiola MD  12/28/2024  18:11 EST

## 2024-12-29 LAB — BACTERIA SPEC AEROBE CULT: NORMAL

## 2024-12-29 NOTE — NON STRESS TEST
"  Negrita Silvestre, a  at 31w4d with an BRIGID of 2025, by Other Basis, was seen at [facility] OBSTETRIC EMERGENCY DEPARTMENT for a nonstress test.    Chief Complaint   Patient presents with    Pelvic Pain     JOANNA: Patient says she is \"over it\" and can't get comfortable no matter what. Can't sleep or get comfortable anywhere. Voiding on herself. Constant pain everywhere, mainly lower belly and vagina. Taking Tylenol, last dose around noon. Also thinks she is leaking. Also states she is having contractions every 3 minutes every few hours since she was in the hospital last. No VB. Baby is moving well \"when he wants to\".       Patient Active Problem List   Diagnosis    Congenital HIV infection    Diet controlled gestational diabetes mellitus (GDM) in third trimester    Maternal anemia in pregnancy, antepartum    Anxiety in pregnancy, antepartum     labor in third trimester    Rh negative status during pregnancy in third trimester    Increased BMI     delivery delivered    Encounter for sterilization    Nausea and vomiting    Right upper quadrant abdominal pain    Left upper quadrant abdominal pain    Previous  delivery affecting pregnancy    Nausea and vomiting of pregnancy, antepartum    Hyperemesis complicating pregnancy, antepartum    Pregnancy    Previous  section       Start Time:   Stop Time:                      "

## 2024-12-30 ENCOUNTER — TELEPHONE (OUTPATIENT)
Dept: OBSTETRICS AND GYNECOLOGY | Age: 24
End: 2024-12-30

## 2024-12-30 NOTE — TELEPHONE ENCOUNTER
Spoke with pt's mother & relayed information. Offered an appt to evaluate but pt's mother stated they will monitor symptoms for now. I advised if pt's BP starts to become elevated along with swelling to call back & let us know. Pt's mother stated understanding & has no further questions or concerns at this time.

## 2024-12-30 NOTE — TELEPHONE ENCOUNTER
BP is low, swelling is more concerning with high BP  We can see in office today or tomorrow, NP is fine.

## 2025-01-03 ENCOUNTER — ROUTINE PRENATAL (OUTPATIENT)
Dept: OBSTETRICS AND GYNECOLOGY | Age: 25
End: 2025-01-03
Payer: COMMERCIAL

## 2025-01-03 VITALS — SYSTOLIC BLOOD PRESSURE: 106 MMHG | DIASTOLIC BLOOD PRESSURE: 64 MMHG | WEIGHT: 233 LBS | BODY MASS INDEX: 45.5 KG/M2

## 2025-01-03 DIAGNOSIS — R63.8 INCREASED BMI: ICD-10-CM

## 2025-01-03 DIAGNOSIS — O24.410 DIET CONTROLLED GESTATIONAL DIABETES MELLITUS (GDM) IN THIRD TRIMESTER: ICD-10-CM

## 2025-01-03 DIAGNOSIS — Z67.91 RH NEGATIVE STATUS DURING PREGNANCY IN THIRD TRIMESTER: ICD-10-CM

## 2025-01-03 DIAGNOSIS — Z21 CONGENITAL HIV INFECTION: ICD-10-CM

## 2025-01-03 DIAGNOSIS — O26.893 RH NEGATIVE STATUS DURING PREGNANCY IN THIRD TRIMESTER: ICD-10-CM

## 2025-01-03 DIAGNOSIS — O34.219 PREVIOUS CESAREAN DELIVERY AFFECTING PREGNANCY: ICD-10-CM

## 2025-01-03 DIAGNOSIS — F41.9 ANXIETY IN PREGNANCY, ANTEPARTUM: ICD-10-CM

## 2025-01-03 DIAGNOSIS — Z3A.32 32 WEEKS GESTATION OF PREGNANCY: Primary | ICD-10-CM

## 2025-01-03 DIAGNOSIS — O99.340 ANXIETY IN PREGNANCY, ANTEPARTUM: ICD-10-CM

## 2025-01-03 LAB
GLUCOSE UR STRIP-MCNC: NEGATIVE MG/DL
PROT UR STRIP-MCNC: NEGATIVE MG/DL

## 2025-01-03 RX ORDER — DIPHENHYDRAMINE HCL 25 MG
25 TABLET ORAL NIGHTLY PRN
Qty: 30 TABLET | Refills: 1 | Status: SHIPPED | OUTPATIENT
Start: 2025-01-03

## 2025-01-03 NOTE — PROGRESS NOTES
Patient is miserable  This is normal event for her  She is having some lower extremity swelling  Her blood pressure is great  Baby is moving well  Her nausea is stable  She is spot checking her blood sugars and they have been in the normal range  Follow-up 1 week for start of weekly BPP's  She is taking her HIV medication regularly    Chief Complaint   Patient presents with    Routine Prenatal Visit     Ob Check - Pt is 32w3d, Pt has not been checking blood sugars, Pt has had a few episodes of low BP, Pt c/o swelling & has been feeling a lot of discomfort       HPI:  Pt presents for routine prenatal visit    ROS:  No fever or chills, no nausea or vomiting, no contractions, no leg pain, no LE edema, no leaking fluid, no bleeding, no headache, no dysuria  All other systems reviewed and negative    Exam:  See flow sheet  General:  Alert and oriented and no distress  Neck: no lymphadenopathy or thyromegaly  Lungs: non - labored breathing  Abd:  See flow sheet, fundus nontender  Ext: see flow sheet, non-tender bilateral , no lesions  Neuro: grossly normal    Assessment:/ PLAN:    Diagnoses and all orders for this visit:    1. 32 weeks gestation of pregnancy (Primary)  -     POC Urinalysis Dipstick    2. Previous  delivery affecting pregnancy    3. Rh negative status during pregnancy in third trimester    4. Increased BMI    5. Congenital HIV infection    6. Anxiety in pregnancy, antepartum    7. Diet controlled gestational diabetes mellitus (GDM) in third trimester    Other orders  -     diphenhydrAMINE (Benadryl Allergy) 25 MG tablet; Take 1 tablet by mouth At Night As Needed for Itching.  Dispense: 30 tablet; Refill: 1

## 2025-01-09 ENCOUNTER — TELEPHONE (OUTPATIENT)
Dept: OBSTETRICS AND GYNECOLOGY | Age: 25
End: 2025-01-09

## 2025-01-09 ENCOUNTER — TELEPHONE (OUTPATIENT)
Dept: OBSTETRICS AND GYNECOLOGY | Age: 25
End: 2025-01-09
Payer: COMMERCIAL

## 2025-01-09 NOTE — TELEPHONE ENCOUNTER
DELETE AFTER REVIEWING: Send this encounter to the appropriate pool. See your Call Action Grid or Workflows for direction.    Caller: Negrita Silvestre    Relationship to patient: Self    Best call back number: [phone]    Chief complaint: PT STATED SHE HAS NOT BEEN TESTED FOR COVID YET AND WANTS TO CANCEL APPT FOR 01/10 UNABLE TO WT CALL.    Type of visit: U/S AND OB FU APPT       If rescheduling, when is the original appointment: 01/10/25     Additional notes:PLEASE CALL PT TO R/S UNABLE TO WT CALL; PT IS SEEN WEEKLY OB CHECK AND BPP.

## 2025-01-09 NOTE — TELEPHONE ENCOUNTER
Per Dr. Daley - Schedule pt on NP/PA on Monday. It looks like there are a few patient care spots available. Pt can either come in for US & leave/comeback for appt with NP/PA or pt can come in to see NP/PA without US but will need NST.

## 2025-01-09 NOTE — TELEPHONE ENCOUNTER
Patient mother is calling because the patient has been exposed to covid. She is scheduled for an appt tomorrow and wants to know what she should do?

## 2025-01-17 ENCOUNTER — ROUTINE PRENATAL (OUTPATIENT)
Dept: OBSTETRICS AND GYNECOLOGY | Age: 25
End: 2025-01-17
Payer: COMMERCIAL

## 2025-01-17 VITALS — WEIGHT: 237 LBS | DIASTOLIC BLOOD PRESSURE: 68 MMHG | SYSTOLIC BLOOD PRESSURE: 114 MMHG | BODY MASS INDEX: 46.29 KG/M2

## 2025-01-17 DIAGNOSIS — R63.8 INCREASED BMI: ICD-10-CM

## 2025-01-17 DIAGNOSIS — Z21 CONGENITAL HIV INFECTION: ICD-10-CM

## 2025-01-17 DIAGNOSIS — O24.410 DIET CONTROLLED GESTATIONAL DIABETES MELLITUS (GDM) IN THIRD TRIMESTER: ICD-10-CM

## 2025-01-17 DIAGNOSIS — Z67.91 RH NEGATIVE STATUS DURING PREGNANCY IN THIRD TRIMESTER: ICD-10-CM

## 2025-01-17 DIAGNOSIS — F41.9 ANXIETY IN PREGNANCY, ANTEPARTUM: ICD-10-CM

## 2025-01-17 DIAGNOSIS — O99.340 ANXIETY IN PREGNANCY, ANTEPARTUM: ICD-10-CM

## 2025-01-17 DIAGNOSIS — O26.893 RH NEGATIVE STATUS DURING PREGNANCY IN THIRD TRIMESTER: ICD-10-CM

## 2025-01-17 DIAGNOSIS — Z98.891 PREVIOUS CESAREAN SECTION: ICD-10-CM

## 2025-01-17 DIAGNOSIS — Z3A.34 34 WEEKS GESTATION OF PREGNANCY: Primary | ICD-10-CM

## 2025-01-17 DIAGNOSIS — O36.63X0 EXCESSIVE FETAL GROWTH AFFECTING MANAGEMENT OF PREGNANCY IN THIRD TRIMESTER, SINGLE OR UNSPECIFIED FETUS: ICD-10-CM

## 2025-01-17 DIAGNOSIS — Z13.89 SCREENING FOR BLOOD OR PROTEIN IN URINE: ICD-10-CM

## 2025-01-17 LAB
BILIRUB BLD-MCNC: NEGATIVE MG/DL
GLUCOSE UR STRIP-MCNC: NEGATIVE MG/DL
KETONES UR QL: ABNORMAL
LEUKOCYTE EST, POC: NEGATIVE
NITRITE UR-MCNC: NEGATIVE MG/ML
PH UR: 5.5 [PH] (ref 5–8)
PROT UR STRIP-MCNC: NEGATIVE MG/DL
RBC # UR STRIP: NEGATIVE /UL
SP GR UR: 1.03 (ref 1–1.03)
UROBILINOGEN UR QL: ABNORMAL

## 2025-01-17 NOTE — PROGRESS NOTES
Chief Complaint   Patient presents with    Routine Prenatal Visit     Ob check & bpp- 34w3d, c/o pelvic pressure x1-2 days, reports good fm, will get rsv shot next week       HPI: 24 y.o.  at 34w3d presents for OB follow-up.  Patient of Dr. Daley  She is doing well today.   Denies loss of fluid, vaginal bleeding, and contractions.    Endorses fetal movements.  Feeling a lot of pelvic pressure, wants her cervix checked.  Taking all medications.  Did not bring glucose logs but reports all normal values.  BPP today is 8 out of 8. Large for gestational age fetus EFW:96.3%ile, AC:>99%ile.  Posterior placenta no previa, vertex fetus. Normal LYNDSEY: 15.63 cm, DVP: 4.93 cm.  Patient declines RSV vaccine today, states she will get it next week.    Relevant data reviewed:  US Fetal Biophysical Profile;Without Non-Stress Testing (2025 09:25)     Last OB US Data (since 2024)         Value Time User    EFW%ILE  96.3%ile 2025 10:20 AM Ping Hamlin PA-C    AC%ILE  >99%ile 2025 10:20 AM Ping Hamlin PA-C    Placenta location  Posterior 2025 10:20 AM iPng Hamlin PA-C          Vitals:    25 0954   BP: 114/68   Weight: 108 kg (237 lb)     Total weight gain for pregnancy:  10.4 kg (23 lb)    Review of systems:   Gen: negative  CV:     negative  GI: negative  :   good fetal movement noted  and pelvic pressure  MS:    negative  Neuro: negative  Pul: negative    Physical Exam  Constitutional:       General: She is not in acute distress.  Cardiovascular:      Rate and Rhythm: Normal rate and regular rhythm.   Pulmonary:      Effort: Pulmonary effort is normal.      Breath sounds: Normal breath sounds.   Abdominal:      Palpations: Abdomen is soft.      Tenderness: There is no abdominal tenderness.   Genitourinary:     Exam position: Lithotomy position.      Labia:         Right: No rash, tenderness or lesion.         Left: No rash, tenderness or lesion.       Vagina:  Normal.      Cervix: Normal.      Comments: Cervix closed, no bleeding  Skin:     General: Skin is warm.   Neurological:      Mental Status: She is alert and oriented to person, place, and time.   Psychiatric:         Mood and Affect: Mood normal.         Behavior: Behavior normal.         Thought Content: Thought content normal.         Judgment: Judgment normal.         A/P  1. Intrauterine pregnancy at 34w3d   2. Pregnancy Risk:  HIGH RISK    Diagnoses and all orders for this visit:    1. 34 weeks gestation of pregnancy (Primary)    2. Screening for blood or protein in urine  -     POC Urinalysis Dipstick    3. Previous  section    4. Rh negative status during pregnancy in third trimester    5. Diet controlled gestational diabetes mellitus (GDM) in third trimester    6. Congenital HIV infection    7. Increased BMI    8. Anxiety in pregnancy, antepartum    9. Excessive fetal growth affecting management of pregnancy in third trimester, single or unspecified fetus        Pre-eclampsia symptoms were discussed and warnings were given.   labor was discussed.  Warnings were provided.  -----------------------  PLAN:   -Reassured patient that her cervix is close.  Pelvic pressure is to be expected with third trimester and large for gestational age fetus.  -BPP today is 8 out of 8. Large for gestational age fetus EFW:96.3%ile, AC:>99%ile.  -Continue weekly BPP  -Continue all medications  -Continue glucose logs  -Patient declines RSV vaccine today, states she will get it next week.  Return for Next scheduled follow up. - in 1 week on 2025 for BPP and OB f/u with Dr. Edi Hamlin PA-C  2025 18:14 EST

## 2025-01-23 ENCOUNTER — ROUTINE PRENATAL (OUTPATIENT)
Dept: OBSTETRICS AND GYNECOLOGY | Age: 25
End: 2025-01-23
Payer: COMMERCIAL

## 2025-01-23 VITALS — BODY MASS INDEX: 45.5 KG/M2 | WEIGHT: 233 LBS | DIASTOLIC BLOOD PRESSURE: 72 MMHG | SYSTOLIC BLOOD PRESSURE: 112 MMHG

## 2025-01-23 DIAGNOSIS — O99.340 ANXIETY IN PREGNANCY, ANTEPARTUM: ICD-10-CM

## 2025-01-23 DIAGNOSIS — O26.893 RH NEGATIVE STATUS DURING PREGNANCY IN THIRD TRIMESTER: ICD-10-CM

## 2025-01-23 DIAGNOSIS — F41.9 ANXIETY IN PREGNANCY, ANTEPARTUM: ICD-10-CM

## 2025-01-23 DIAGNOSIS — Z3A.35 35 WEEKS GESTATION OF PREGNANCY: Primary | ICD-10-CM

## 2025-01-23 DIAGNOSIS — O40.3XX0 POLYHYDRAMNIOS IN THIRD TRIMESTER COMPLICATION, SINGLE OR UNSPECIFIED FETUS: ICD-10-CM

## 2025-01-23 DIAGNOSIS — O24.410 DIET CONTROLLED GESTATIONAL DIABETES MELLITUS (GDM) IN THIRD TRIMESTER: ICD-10-CM

## 2025-01-23 DIAGNOSIS — Z67.91 RH NEGATIVE STATUS DURING PREGNANCY IN THIRD TRIMESTER: ICD-10-CM

## 2025-01-23 DIAGNOSIS — Z21 CONGENITAL HIV INFECTION: ICD-10-CM

## 2025-01-23 DIAGNOSIS — Z98.891 PREVIOUS CESAREAN SECTION: ICD-10-CM

## 2025-01-23 DIAGNOSIS — Z36.85 ANTENATAL SCREENING FOR STREPTOCOCCUS B: ICD-10-CM

## 2025-01-23 LAB
BILIRUB BLD-MCNC: NEGATIVE MG/DL
CLARITY, POC: CLEAR
COLOR UR: YELLOW
GLUCOSE UR STRIP-MCNC: NEGATIVE MG/DL
KETONES UR QL: NEGATIVE
LEUKOCYTE EST, POC: NEGATIVE
NITRITE UR-MCNC: NEGATIVE MG/ML
PH UR: 6 [PH] (ref 5–8)
PROT UR STRIP-MCNC: NEGATIVE MG/DL
RBC # UR STRIP: NEGATIVE /UL
SP GR UR: 1.02 (ref 1–1.03)
UROBILINOGEN UR QL: NORMAL

## 2025-01-23 NOTE — PROGRESS NOTES
patient is feeling well  Baby is moving well  Ultrasound was done today showing a BPP 8/8  Vertex presentation  LYNDSEY is mildly polyhydramnios at 25.24RSV vaccine today  GBS was done today  Cervix is 50/closed/-3  Fetal movement counts and labor warnings given  Follow-up 1 week BPP   Glucose levels are good    Chief Complaint   Patient presents with    Routine Prenatal Visit     Ob Check & BPP US - Pt is 35w2d, GBS today, Pt stating she feels a lot of pelvic pressure with urination       HPI:  Pt presents for routine prenatal visit    ROS:  No fever or chills, no nausea or vomiting, no contractions, no leg pain, no LE edema, no leaking fluid, no bleeding, no headache, no dysuria  All other systems reviewed and negative    Exam:  See flow sheet  General:  Alert and oriented and no distress  Neck: no lymphadenopathy or thyromegaly  Lungs: non - labored breathing  Abd:  See flow sheet, fundus nontender  Ext: see flow sheet, non-tender bilateral , no lesions  Neuro: grossly normal    Assessment:/ PLAN:    Diagnoses and all orders for this visit:    1. 35 weeks gestation of pregnancy (Primary)  -     POC Urinalysis Dipstick    2.  screening for streptococcus B  -     Group B Streptococcus Culture - Swab, Vaginal/Rectum    3. Diet controlled gestational diabetes mellitus (GDM) in third trimester    4. Previous  section    5. Rh negative status during pregnancy in third trimester    6. Congenital HIV infection    7. Anxiety in pregnancy, antepartum    8. Polyhydramnios in third trimester complication, single or unspecified fetus    Other orders  -     ABRYSVO RSV Vaccine (Adults 60+, pregnant women 32-36 wks)

## 2025-01-27 LAB — B-HEM STREP SPEC QL CULT: NEGATIVE

## 2025-01-30 ENCOUNTER — ROUTINE PRENATAL (OUTPATIENT)
Dept: OBSTETRICS AND GYNECOLOGY | Age: 25
End: 2025-01-30
Payer: COMMERCIAL

## 2025-01-30 VITALS — DIASTOLIC BLOOD PRESSURE: 74 MMHG | WEIGHT: 234 LBS | SYSTOLIC BLOOD PRESSURE: 114 MMHG | BODY MASS INDEX: 45.7 KG/M2

## 2025-01-30 DIAGNOSIS — O99.340 ANXIETY IN PREGNANCY, ANTEPARTUM: ICD-10-CM

## 2025-01-30 DIAGNOSIS — O26.893 RH NEGATIVE STATUS DURING PREGNANCY IN THIRD TRIMESTER: ICD-10-CM

## 2025-01-30 DIAGNOSIS — Z67.91 RH NEGATIVE STATUS DURING PREGNANCY IN THIRD TRIMESTER: ICD-10-CM

## 2025-01-30 DIAGNOSIS — F41.9 ANXIETY IN PREGNANCY, ANTEPARTUM: ICD-10-CM

## 2025-01-30 DIAGNOSIS — O40.3XX0 POLYHYDRAMNIOS IN THIRD TRIMESTER COMPLICATION, SINGLE OR UNSPECIFIED FETUS: ICD-10-CM

## 2025-01-30 DIAGNOSIS — Z3A.36 36 WEEKS GESTATION OF PREGNANCY: Primary | ICD-10-CM

## 2025-01-30 DIAGNOSIS — O24.410 DIET CONTROLLED GESTATIONAL DIABETES MELLITUS (GDM) IN THIRD TRIMESTER: ICD-10-CM

## 2025-01-30 DIAGNOSIS — Z98.891 PREVIOUS CESAREAN SECTION: ICD-10-CM

## 2025-01-30 DIAGNOSIS — R63.8 INCREASED BMI: ICD-10-CM

## 2025-01-30 DIAGNOSIS — Z21 CONGENITAL HIV INFECTION: ICD-10-CM

## 2025-01-30 LAB
GLUCOSE UR STRIP-MCNC: NEGATIVE MG/DL
PROT UR STRIP-MCNC: NEGATIVE MG/DL

## 2025-01-30 NOTE — PROGRESS NOTES
Patient is doing okay  She is very uncomfortable and having some contractions occasionally  Ultrasound was done showing BPP 8/8  She has mild polyhydramnios at LYNDSEY of 26  Good fetal movement  Labor warnings and fetal movement counts  Follow-up 1 week BPP    Chief Complaint   Patient presents with    Routine Prenatal Visit     Ob Check & BPP US - Pt is 36w2d, Pt c/o feeling uncomfortable with a lot of body pains at night         HPI:  Pt presents for routine prenatal visit    ROS:  No fever or chills, no nausea or vomiting, no contractions, no leg pain, no LE edema, no leaking fluid, no bleeding, no headache, no dysuria  All other systems reviewed and negative    Exam:  See flow sheet  General:  Alert and oriented and no distress  Neck: no lymphadenopathy or thyromegaly  Lungs: non - labored breathing  Abd:  See flow sheet, fundus nontender  Ext: see flow sheet, non-tender bilateral , no lesions  Neuro: grossly normal    Assessment:/ PLAN:    Diagnoses and all orders for this visit:    1. 36 weeks gestation of pregnancy (Primary)  -     POC Urinalysis Dipstick    2. Diet controlled gestational diabetes mellitus (GDM) in third trimester    3. Previous  section    4. Rh negative status during pregnancy in third trimester    5. Congenital HIV infection    6. Anxiety in pregnancy, antepartum    7. Polyhydramnios in third trimester complication, single or unspecified fetus    8. Increased BMI

## 2025-02-01 ENCOUNTER — HOSPITAL ENCOUNTER (INPATIENT)
Facility: HOSPITAL | Age: 25
LOS: 4 days | Discharge: HOME | End: 2025-02-06
Attending: OBSTETRICS & GYNECOLOGY | Admitting: OBSTETRICS & GYNECOLOGY
Payer: COMMERCIAL

## 2025-02-01 ENCOUNTER — ANESTHESIA EVENT (OUTPATIENT)
Dept: LABOR AND DELIVERY | Facility: HOSPITAL | Age: 25
End: 2025-02-01
Payer: COMMERCIAL

## 2025-02-01 ENCOUNTER — ANESTHESIA (OUTPATIENT)
Dept: LABOR AND DELIVERY | Facility: HOSPITAL | Age: 25
End: 2025-02-01
Payer: COMMERCIAL

## 2025-02-01 DIAGNOSIS — Z98.891 PREVIOUS CESAREAN SECTION: ICD-10-CM

## 2025-02-01 PROBLEM — O09.899 SHORT INTERVAL BETWEEN PREGNANCIES AFFECTING PREGNANCY, ANTEPARTUM: Status: ACTIVE | Noted: 2025-02-01

## 2025-02-01 PROBLEM — Z37.9 NORMAL LABOR: Status: ACTIVE | Noted: 2025-02-01

## 2025-02-01 PROBLEM — O40.3XX0 POLYHYDRAMNIOS IN THIRD TRIMESTER: Status: ACTIVE | Noted: 2025-02-01

## 2025-02-01 LAB
A1 MICROGLOB PLACENTAL VAG QL: NEGATIVE
ABO GROUP BLD: NORMAL
BASOPHILS # BLD AUTO: 0.02 10*3/MM3 (ref 0–0.2)
BASOPHILS NFR BLD AUTO: 0.2 % (ref 0–1.5)
BILIRUB UR QL STRIP: NEGATIVE
BLD GP AB SCN SERPL QL: NEGATIVE
CLARITY UR: ABNORMAL
COLOR UR: YELLOW
DEPRECATED RDW RBC AUTO: 44.1 FL (ref 37–54)
EOSINOPHIL # BLD AUTO: 0.06 10*3/MM3 (ref 0–0.4)
EOSINOPHIL NFR BLD AUTO: 0.7 % (ref 0.3–6.2)
ERYTHROCYTE [DISTWIDTH] IN BLOOD BY AUTOMATED COUNT: 14 % (ref 12.3–15.4)
GLUCOSE UR STRIP-MCNC: NEGATIVE MG/DL
HCT VFR BLD AUTO: 32.6 % (ref 34–46.6)
HGB BLD-MCNC: 11.7 G/DL (ref 12–15.9)
HGB UR QL STRIP.AUTO: NEGATIVE
IMM GRANULOCYTES # BLD AUTO: 0.11 10*3/MM3 (ref 0–0.05)
IMM GRANULOCYTES NFR BLD AUTO: 1.3 % (ref 0–0.5)
KETONES UR QL STRIP: NEGATIVE
LEUKOCYTE ESTERASE UR QL STRIP.AUTO: NEGATIVE
LYMPHOCYTES # BLD AUTO: 2.1 10*3/MM3 (ref 0.7–3.1)
LYMPHOCYTES NFR BLD AUTO: 24.4 % (ref 19.6–45.3)
MCH RBC QN AUTO: 31.6 PG (ref 26.6–33)
MCHC RBC AUTO-ENTMCNC: 35.9 G/DL (ref 31.5–35.7)
MCV RBC AUTO: 88.1 FL (ref 79–97)
MONOCYTES # BLD AUTO: 0.73 10*3/MM3 (ref 0.1–0.9)
MONOCYTES NFR BLD AUTO: 8.5 % (ref 5–12)
NEUTROPHILS NFR BLD AUTO: 5.58 10*3/MM3 (ref 1.7–7)
NEUTROPHILS NFR BLD AUTO: 64.9 % (ref 42.7–76)
NITRITE UR QL STRIP: NEGATIVE
NRBC BLD AUTO-RTO: 0 /100 WBC (ref 0–0.2)
PH UR STRIP.AUTO: 6.5 [PH] (ref 5–8)
PLATELET # BLD AUTO: 209 10*3/MM3 (ref 140–450)
PMV BLD AUTO: 10.3 FL (ref 6–12)
PROT UR QL STRIP: NEGATIVE
RBC # BLD AUTO: 3.7 10*6/MM3 (ref 3.77–5.28)
RH BLD: NEGATIVE
SP GR UR STRIP: 1.03 (ref 1–1.03)
T&S EXPIRATION DATE: NORMAL
TREPONEMA PALLIDUM IGG+IGM AB [PRESENCE] IN SERUM OR PLASMA BY IMMUNOASSAY: NORMAL
UROBILINOGEN UR QL STRIP: ABNORMAL
WBC NRBC COR # BLD AUTO: 8.6 10*3/MM3 (ref 3.4–10.8)

## 2025-02-01 PROCEDURE — 99202 OFFICE O/P NEW SF 15 MIN: CPT | Performed by: OBSTETRICS & GYNECOLOGY

## 2025-02-01 PROCEDURE — 25810000003 LACTATED RINGERS PER 1000 ML: Performed by: OBSTETRICS & GYNECOLOGY

## 2025-02-01 PROCEDURE — 85025 COMPLETE CBC W/AUTO DIFF WBC: CPT | Performed by: OBSTETRICS & GYNECOLOGY

## 2025-02-01 PROCEDURE — 81003 URINALYSIS AUTO W/O SCOPE: CPT | Performed by: OBSTETRICS & GYNECOLOGY

## 2025-02-01 PROCEDURE — 86901 BLOOD TYPING SEROLOGIC RH(D): CPT | Performed by: OBSTETRICS & GYNECOLOGY

## 2025-02-01 PROCEDURE — 86850 RBC ANTIBODY SCREEN: CPT | Performed by: OBSTETRICS & GYNECOLOGY

## 2025-02-01 PROCEDURE — 87086 URINE CULTURE/COLONY COUNT: CPT | Performed by: OBSTETRICS & GYNECOLOGY

## 2025-02-01 PROCEDURE — 25010000002 ONDANSETRON PER 1 MG: Performed by: OBSTETRICS & GYNECOLOGY

## 2025-02-01 PROCEDURE — 25010000002 ZIDOVUDINE PER 10 MG: Performed by: OBSTETRICS & GYNECOLOGY

## 2025-02-01 PROCEDURE — 87536 HIV-1 QUANT&REVRSE TRNSCRPJ: CPT | Performed by: OBSTETRICS & GYNECOLOGY

## 2025-02-01 PROCEDURE — 86780 TREPONEMA PALLIDUM: CPT | Performed by: OBSTETRICS & GYNECOLOGY

## 2025-02-01 PROCEDURE — 84112 EVAL AMNIOTIC FLUID PROTEIN: CPT | Performed by: OBSTETRICS & GYNECOLOGY

## 2025-02-01 PROCEDURE — 86900 BLOOD TYPING SEROLOGIC ABO: CPT | Performed by: OBSTETRICS & GYNECOLOGY

## 2025-02-01 RX ORDER — DIPHENHYDRAMINE HYDROCHLORIDE 50 MG/ML
12.5 INJECTION INTRAMUSCULAR; INTRAVENOUS EVERY 8 HOURS PRN
Status: CANCELLED | OUTPATIENT
Start: 2025-02-01

## 2025-02-01 RX ORDER — FENTANYL/ROPIVACAINE/NS/PF 2MCG/ML-.2
10 PLASTIC BAG, INJECTION (ML) INJECTION CONTINUOUS
Status: CANCELLED | OUTPATIENT
Start: 2025-02-01 | End: 2025-02-04

## 2025-02-01 RX ORDER — RITONAVIR 100 MG/1
100 TABLET ORAL EVERY 12 HOURS SCHEDULED
Status: DISCONTINUED | OUTPATIENT
Start: 2025-02-01 | End: 2025-02-06 | Stop reason: HOSPADM

## 2025-02-01 RX ORDER — ONDANSETRON 2 MG/ML
4 INJECTION INTRAMUSCULAR; INTRAVENOUS ONCE AS NEEDED
Status: CANCELLED | OUTPATIENT
Start: 2025-02-01

## 2025-02-01 RX ORDER — DARUNAVIR 600 MG/1
600 TABLET, FILM COATED ORAL 2 TIMES DAILY WITH MEALS
Status: DISCONTINUED | OUTPATIENT
Start: 2025-02-01 | End: 2025-02-06 | Stop reason: HOSPADM

## 2025-02-01 RX ORDER — ONDANSETRON 2 MG/ML
4 INJECTION INTRAMUSCULAR; INTRAVENOUS EVERY 6 HOURS PRN
Status: DISCONTINUED | OUTPATIENT
Start: 2025-02-01 | End: 2025-02-02

## 2025-02-01 RX ORDER — SODIUM CHLORIDE 0.9 % (FLUSH) 0.9 %
10 SYRINGE (ML) INJECTION EVERY 12 HOURS SCHEDULED
Status: DISCONTINUED | OUTPATIENT
Start: 2025-02-01 | End: 2025-02-02

## 2025-02-01 RX ORDER — SODIUM CHLORIDE 0.9 % (FLUSH) 0.9 %
10 SYRINGE (ML) INJECTION AS NEEDED
Status: DISCONTINUED | OUTPATIENT
Start: 2025-02-01 | End: 2025-02-02

## 2025-02-01 RX ORDER — EPHEDRINE SULFATE 50 MG/ML
5 INJECTION, SOLUTION INTRAVENOUS
Status: CANCELLED | OUTPATIENT
Start: 2025-02-01

## 2025-02-01 RX ORDER — FAMOTIDINE 10 MG/ML
20 INJECTION, SOLUTION INTRAVENOUS ONCE AS NEEDED
Status: CANCELLED | OUTPATIENT
Start: 2025-02-01

## 2025-02-01 RX ORDER — SODIUM CHLORIDE, SODIUM LACTATE, POTASSIUM CHLORIDE, CALCIUM CHLORIDE 600; 310; 30; 20 MG/100ML; MG/100ML; MG/100ML; MG/100ML
1000 INJECTION, SOLUTION INTRAVENOUS ONCE
Status: COMPLETED | OUTPATIENT
Start: 2025-02-01 | End: 2025-02-01

## 2025-02-01 RX ORDER — SODIUM CHLORIDE, SODIUM LACTATE, POTASSIUM CHLORIDE, CALCIUM CHLORIDE 600; 310; 30; 20 MG/100ML; MG/100ML; MG/100ML; MG/100ML
125 INJECTION, SOLUTION INTRAVENOUS CONTINUOUS
Status: DISCONTINUED | OUTPATIENT
Start: 2025-02-01 | End: 2025-02-02

## 2025-02-01 RX ADMIN — DARUNAVIR 600 MG: 600 TABLET, FILM COATED ORAL at 23:30

## 2025-02-01 RX ADMIN — SODIUM CHLORIDE, POTASSIUM CHLORIDE, SODIUM LACTATE AND CALCIUM CHLORIDE 1000 ML: 600; 310; 30; 20 INJECTION, SOLUTION INTRAVENOUS at 22:58

## 2025-02-01 RX ADMIN — SODIUM CHLORIDE, POTASSIUM CHLORIDE, SODIUM LACTATE AND CALCIUM CHLORIDE 125 ML/HR: 600; 310; 30; 20 INJECTION, SOLUTION INTRAVENOUS at 23:55

## 2025-02-01 RX ADMIN — EMTRICITABINE AND TENOFOVIR ALAFENAMIDE 1 TABLET: 200; 25 TABLET ORAL at 23:30

## 2025-02-01 RX ADMIN — RITONAVIR 100 MG: 100 TABLET, FILM COATED ORAL at 23:30

## 2025-02-01 RX ADMIN — ZIDOVUDINE 212 MG: 10 INJECTION, SOLUTION INTRAVENOUS at 23:28

## 2025-02-01 RX ADMIN — ONDANSETRON 4 MG: 2 INJECTION, SOLUTION INTRAMUSCULAR; INTRAVENOUS at 23:03

## 2025-02-02 PROBLEM — Z37.9 NORMAL LABOR: Status: RESOLVED | Noted: 2025-02-01 | Resolved: 2025-02-02

## 2025-02-02 PROBLEM — Z90.79 STATUS POST BILATERAL SALPINGECTOMY: Status: ACTIVE | Noted: 2025-02-02

## 2025-02-02 LAB
ABO GROUP BLD: NORMAL
ALBUMIN SERPL-MCNC: 3.1 G/DL (ref 3.5–5.2)
ALBUMIN/GLOB SERPL: 1.1 G/DL
ALP SERPL-CCNC: 116 U/L (ref 39–117)
ALT SERPL W P-5'-P-CCNC: 15 U/L (ref 1–33)
ANION GAP SERPL CALCULATED.3IONS-SCNC: 14.2 MMOL/L (ref 5–15)
AST SERPL-CCNC: 13 U/L (ref 1–32)
ATMOSPHERIC PRESS: 752.9 MMHG
ATMOSPHERIC PRESS: 754.2 MMHG
BASE EXCESS BLDCOA CALC-SCNC: 0.2 MMOL/L (ref -2–2)
BASE EXCESS BLDCOV CALC-SCNC: -1 MMOL/L (ref -30–30)
BDY SITE: ABNORMAL
BDY SITE: NORMAL
BILIRUB SERPL-MCNC: <0.2 MG/DL (ref 0–1.2)
BUN SERPL-MCNC: 7 MG/DL (ref 6–20)
BUN/CREAT SERPL: 15.2 (ref 7–25)
CALCIUM SPEC-SCNC: 8 MG/DL (ref 8.6–10.5)
CHLORIDE SERPL-SCNC: 102 MMOL/L (ref 98–107)
CO2 BLDA-SCNC: 26.7 MMOL/L (ref 23–27)
CO2 BLDA-SCNC: 32.2 MMOL/L (ref 23–27)
CO2 SERPL-SCNC: 18.8 MMOL/L (ref 22–29)
CREAT SERPL-MCNC: 0.46 MG/DL (ref 0.57–1)
DEVICE COMMENT: ABNORMAL
DEVICE COMMENT: NORMAL
EGFRCR SERPLBLD CKD-EPI 2021: 136.4 ML/MIN/1.73
FETAL BLEED: NEGATIVE
GAS FLOW AIRWAY: 2 LPM
GAS FLOW AIRWAY: 2 LPM
GLOBULIN UR ELPH-MCNC: 2.9 GM/DL
GLUCOSE SERPL-MCNC: 100 MG/DL (ref 65–99)
HCO3 BLDCOA-SCNC: 30 MMOL/L (ref 22–28)
HCO3 BLDCOV-SCNC: 25.3 MMOL/L
MODALITY: ABNORMAL
MODALITY: NORMAL
NUMBER OF DOSES: NORMAL
PCO2 BLDCOA: 71.6 MMHG (ref 43–63)
PCO2 BLDCOV: 47.7 MM HG (ref 35–51.3)
PH BLDCOA: 7.23 PH UNITS (ref 7.18–7.34)
PH BLDCOV: 7.33 PH UNITS (ref 7.26–7.4)
PO2 BLDCOA: <22.1 MMHG (ref 12–26)
PO2 BLDCOV: 35.5 MM HG (ref 19–39)
POTASSIUM SERPL-SCNC: 3.4 MMOL/L (ref 3.5–5.2)
PROT SERPL-MCNC: 6 G/DL (ref 6–8.5)
RH BLD: NEGATIVE
SAO2 % BLDCOA: 18.9 %
SAO2 % BLDCOV: NORMAL %
SODIUM SERPL-SCNC: 135 MMOL/L (ref 136–145)

## 2025-02-02 PROCEDURE — 25010000002 FENTANYL CITRATE (PF) 50 MCG/ML SOLUTION: Performed by: ANESTHESIOLOGY

## 2025-02-02 PROCEDURE — 25010000002 MORPHINE PER 10 MG: Performed by: ANESTHESIOLOGY

## 2025-02-02 PROCEDURE — 25010000002 ONDANSETRON PER 1 MG: Performed by: ANESTHESIOLOGY

## 2025-02-02 PROCEDURE — 0UB70ZZ EXCISION OF BILATERAL FALLOPIAN TUBES, OPEN APPROACH: ICD-10-PCS | Performed by: OBSTETRICS & GYNECOLOGY

## 2025-02-02 PROCEDURE — 25010000002 KETOROLAC TROMETHAMINE PER 15 MG: Performed by: OBSTETRICS & GYNECOLOGY

## 2025-02-02 PROCEDURE — 82803 BLOOD GASES ANY COMBINATION: CPT | Performed by: OBSTETRICS & GYNECOLOGY

## 2025-02-02 PROCEDURE — 58611 LIGATE OVIDUCT(S) ADD-ON: CPT | Performed by: OBSTETRICS & GYNECOLOGY

## 2025-02-02 PROCEDURE — 25010000003 DEXTROSE 5 % SOLUTION 250 ML FLEX CONT: Performed by: OBSTETRICS & GYNECOLOGY

## 2025-02-02 PROCEDURE — 25010000002 PHENYLEPHRINE 10 MG/ML SOLUTION: Performed by: NURSE ANESTHETIST, CERTIFIED REGISTERED

## 2025-02-02 PROCEDURE — 3E0234Z INTRODUCTION OF SERUM, TOXOID AND VACCINE INTO MUSCLE, PERCUTANEOUS APPROACH: ICD-10-PCS | Performed by: OBSTETRICS & GYNECOLOGY

## 2025-02-02 PROCEDURE — 63710000001 DIPHENHYDRAMINE PER 50 MG: Performed by: NURSE ANESTHETIST, CERTIFIED REGISTERED

## 2025-02-02 PROCEDURE — 25010000002 DIPHENHYDRAMINE PER 50 MG: Performed by: NURSE ANESTHETIST, CERTIFIED REGISTERED

## 2025-02-02 PROCEDURE — 80053 COMPREHEN METABOLIC PANEL: CPT | Performed by: OBSTETRICS & GYNECOLOGY

## 2025-02-02 PROCEDURE — 86901 BLOOD TYPING SEROLOGIC RH(D): CPT | Performed by: OBSTETRICS & GYNECOLOGY

## 2025-02-02 PROCEDURE — 59515 CESAREAN DELIVERY: CPT | Performed by: OBSTETRICS & GYNECOLOGY

## 2025-02-02 PROCEDURE — 88307 TISSUE EXAM BY PATHOLOGIST: CPT

## 2025-02-02 PROCEDURE — 25010000002 ZIDOVUDINE PER 10 MG: Performed by: OBSTETRICS & GYNECOLOGY

## 2025-02-02 PROCEDURE — 85461 HEMOGLOBIN FETAL: CPT | Performed by: OBSTETRICS & GYNECOLOGY

## 2025-02-02 PROCEDURE — 88302 TISSUE EXAM BY PATHOLOGIST: CPT | Performed by: OBSTETRICS & GYNECOLOGY

## 2025-02-02 PROCEDURE — 25010000002 CEFAZOLIN PER 500 MG: Performed by: OBSTETRICS & GYNECOLOGY

## 2025-02-02 PROCEDURE — 25010000002 RHO D IMMUNE GLOBULIN 1500 UNIT/2ML SOLUTION PREFILLED SYRINGE: Performed by: OBSTETRICS & GYNECOLOGY

## 2025-02-02 PROCEDURE — 25010000002 BUPIVACAINE PF 0.75 % SOLUTION: Performed by: ANESTHESIOLOGY

## 2025-02-02 PROCEDURE — 86900 BLOOD TYPING SEROLOGIC ABO: CPT | Performed by: OBSTETRICS & GYNECOLOGY

## 2025-02-02 RX ORDER — OXYTOCIN/0.9 % SODIUM CHLORIDE 30/500 ML
125 PLASTIC BAG, INJECTION (ML) INTRAVENOUS ONCE AS NEEDED
Status: COMPLETED | OUTPATIENT
Start: 2025-02-02 | End: 2025-02-02

## 2025-02-02 RX ORDER — MISOPROSTOL 200 UG/1
800 TABLET ORAL AS NEEDED
Status: DISCONTINUED | OUTPATIENT
Start: 2025-02-02 | End: 2025-02-02 | Stop reason: HOSPADM

## 2025-02-02 RX ORDER — KETOROLAC TROMETHAMINE 30 MG/ML
30 INJECTION, SOLUTION INTRAMUSCULAR; INTRAVENOUS ONCE
Status: COMPLETED | OUTPATIENT
Start: 2025-02-02 | End: 2025-02-02

## 2025-02-02 RX ORDER — PHENYLEPHRINE HYDROCHLORIDE 10 MG/ML
INJECTION INTRAVENOUS AS NEEDED
Status: DISCONTINUED | OUTPATIENT
Start: 2025-02-02 | End: 2025-02-02 | Stop reason: SURG

## 2025-02-02 RX ORDER — MORPHINE SULFATE 2 MG/ML
2 INJECTION, SOLUTION INTRAMUSCULAR; INTRAVENOUS
Status: ACTIVE | OUTPATIENT
Start: 2025-02-02 | End: 2025-02-02

## 2025-02-02 RX ORDER — DEXAMETHASONE 1.5 MG/1
3 TABLET ORAL EVERY 6 HOURS
Status: COMPLETED | OUTPATIENT
Start: 2025-02-03 | End: 2025-02-03

## 2025-02-02 RX ORDER — ERYTHROMYCIN 5 MG/G
OINTMENT OPHTHALMIC
Status: ACTIVE
Start: 2025-02-02 | End: 2025-02-02

## 2025-02-02 RX ORDER — PROMETHAZINE HYDROCHLORIDE 12.5 MG/1
12.5 TABLET ORAL EVERY 4 HOURS PRN
Status: DISCONTINUED | OUTPATIENT
Start: 2025-02-02 | End: 2025-02-06 | Stop reason: HOSPADM

## 2025-02-02 RX ORDER — SIMETHICONE 80 MG
80 TABLET,CHEWABLE ORAL 4 TIMES DAILY PRN
Status: DISCONTINUED | OUTPATIENT
Start: 2025-02-02 | End: 2025-02-06 | Stop reason: HOSPADM

## 2025-02-02 RX ORDER — DIPHENHYDRAMINE HYDROCHLORIDE 50 MG/ML
25 INJECTION INTRAMUSCULAR; INTRAVENOUS EVERY 4 HOURS PRN
Status: DISCONTINUED | OUTPATIENT
Start: 2025-02-02 | End: 2025-02-06 | Stop reason: HOSPADM

## 2025-02-02 RX ORDER — ONDANSETRON 2 MG/ML
4 INJECTION INTRAMUSCULAR; INTRAVENOUS ONCE AS NEEDED
Status: DISCONTINUED | OUTPATIENT
Start: 2025-02-02 | End: 2025-02-06 | Stop reason: HOSPADM

## 2025-02-02 RX ORDER — IBUPROFEN 600 MG/1
600 TABLET, FILM COATED ORAL EVERY 6 HOURS
Status: DISCONTINUED | OUTPATIENT
Start: 2025-02-03 | End: 2025-02-06 | Stop reason: HOSPADM

## 2025-02-02 RX ORDER — ONDANSETRON 2 MG/ML
4 INJECTION INTRAMUSCULAR; INTRAVENOUS EVERY 6 HOURS PRN
Status: DISCONTINUED | OUTPATIENT
Start: 2025-02-02 | End: 2025-02-06 | Stop reason: HOSPADM

## 2025-02-02 RX ORDER — AMOXICILLIN 250 MG
1 CAPSULE ORAL 2 TIMES DAILY
Status: DISCONTINUED | OUTPATIENT
Start: 2025-02-02 | End: 2025-02-06 | Stop reason: HOSPADM

## 2025-02-02 RX ORDER — SODIUM CHLORIDE 9 MG/ML
40 INJECTION, SOLUTION INTRAVENOUS AS NEEDED
Status: DISCONTINUED | OUTPATIENT
Start: 2025-02-02 | End: 2025-02-02 | Stop reason: HOSPADM

## 2025-02-02 RX ORDER — KETOROLAC TROMETHAMINE 15 MG/ML
15 INJECTION, SOLUTION INTRAMUSCULAR; INTRAVENOUS EVERY 6 HOURS
Status: DISPENSED | OUTPATIENT
Start: 2025-02-02 | End: 2025-02-03

## 2025-02-02 RX ORDER — FENTANYL CITRATE 50 UG/ML
INJECTION, SOLUTION INTRAMUSCULAR; INTRAVENOUS
Status: COMPLETED | OUTPATIENT
Start: 2025-02-02 | End: 2025-02-02

## 2025-02-02 RX ORDER — DEXAMETHASONE 1.5 MG/1
3 TABLET ORAL ONCE
Status: COMPLETED | OUTPATIENT
Start: 2025-02-02 | End: 2025-02-02

## 2025-02-02 RX ORDER — ONDANSETRON 4 MG/1
4 TABLET, ORALLY DISINTEGRATING ORAL EVERY 8 HOURS PRN
Status: DISCONTINUED | OUTPATIENT
Start: 2025-02-02 | End: 2025-02-06 | Stop reason: HOSPADM

## 2025-02-02 RX ORDER — FAMOTIDINE 10 MG/ML
20 INJECTION, SOLUTION INTRAVENOUS ONCE AS NEEDED
Status: COMPLETED | OUTPATIENT
Start: 2025-02-02 | End: 2025-02-02

## 2025-02-02 RX ORDER — METHYLERGONOVINE MALEATE 0.2 MG/ML
200 INJECTION INTRAVENOUS ONCE AS NEEDED
Status: DISCONTINUED | OUTPATIENT
Start: 2025-02-02 | End: 2025-02-02 | Stop reason: HOSPADM

## 2025-02-02 RX ORDER — MORPHINE SULFATE 4 MG/ML
INJECTION, SOLUTION INTRAMUSCULAR; INTRAVENOUS
Status: COMPLETED | OUTPATIENT
Start: 2025-02-02 | End: 2025-02-02

## 2025-02-02 RX ORDER — PHYTONADIONE 1 MG/.5ML
INJECTION, EMULSION INTRAMUSCULAR; INTRAVENOUS; SUBCUTANEOUS
Status: ACTIVE
Start: 2025-02-02 | End: 2025-02-02

## 2025-02-02 RX ORDER — HYDROXYZINE PAMOATE 50 MG/1
50 CAPSULE ORAL 4 TIMES DAILY PRN
Status: DISCONTINUED | OUTPATIENT
Start: 2025-02-02 | End: 2025-02-06 | Stop reason: HOSPADM

## 2025-02-02 RX ORDER — BUPIVACAINE HYDROCHLORIDE 7.5 MG/ML
INJECTION, SOLUTION EPIDURAL; RETROBULBAR
Status: COMPLETED | OUTPATIENT
Start: 2025-02-02 | End: 2025-02-02

## 2025-02-02 RX ORDER — OXYCODONE HYDROCHLORIDE 5 MG/1
5 TABLET ORAL EVERY 4 HOURS PRN
Status: DISCONTINUED | OUTPATIENT
Start: 2025-02-02 | End: 2025-02-06 | Stop reason: HOSPADM

## 2025-02-02 RX ORDER — CARBOPROST TROMETHAMINE 250 UG/ML
250 INJECTION, SOLUTION INTRAMUSCULAR AS NEEDED
Status: DISCONTINUED | OUTPATIENT
Start: 2025-02-02 | End: 2025-02-02 | Stop reason: HOSPADM

## 2025-02-02 RX ORDER — ACETAMINOPHEN 325 MG/1
650 TABLET ORAL EVERY 6 HOURS
Status: DISCONTINUED | OUTPATIENT
Start: 2025-02-03 | End: 2025-02-06 | Stop reason: HOSPADM

## 2025-02-02 RX ORDER — ACETAMINOPHEN 500 MG
1000 TABLET ORAL EVERY 6 HOURS
Status: DISPENSED | OUTPATIENT
Start: 2025-02-02 | End: 2025-02-03

## 2025-02-02 RX ORDER — SODIUM CHLORIDE 0.9 % (FLUSH) 0.9 %
10 SYRINGE (ML) INJECTION EVERY 12 HOURS SCHEDULED
Status: DISCONTINUED | OUTPATIENT
Start: 2025-02-02 | End: 2025-02-02 | Stop reason: HOSPADM

## 2025-02-02 RX ORDER — OXYTOCIN/0.9 % SODIUM CHLORIDE 30/500 ML
999 PLASTIC BAG, INJECTION (ML) INTRAVENOUS ONCE
Status: COMPLETED | OUTPATIENT
Start: 2025-02-02 | End: 2025-02-02

## 2025-02-02 RX ORDER — ENOXAPARIN SODIUM 100 MG/ML
40 INJECTION SUBCUTANEOUS EVERY 12 HOURS
Status: DISCONTINUED | OUTPATIENT
Start: 2025-02-03 | End: 2025-02-06 | Stop reason: HOSPADM

## 2025-02-02 RX ORDER — OXYTOCIN/0.9 % SODIUM CHLORIDE 30/500 ML
250 PLASTIC BAG, INJECTION (ML) INTRAVENOUS CONTINUOUS
Status: DISPENSED | OUTPATIENT
Start: 2025-02-02 | End: 2025-02-02

## 2025-02-02 RX ORDER — HYDROMORPHONE HYDROCHLORIDE 1 MG/ML
0.5 INJECTION, SOLUTION INTRAMUSCULAR; INTRAVENOUS; SUBCUTANEOUS
Status: DISCONTINUED | OUTPATIENT
Start: 2025-02-02 | End: 2025-02-02 | Stop reason: HOSPADM

## 2025-02-02 RX ORDER — OXYCODONE HYDROCHLORIDE 10 MG/1
10 TABLET ORAL EVERY 4 HOURS PRN
Status: DISCONTINUED | OUTPATIENT
Start: 2025-02-02 | End: 2025-02-06 | Stop reason: HOSPADM

## 2025-02-02 RX ORDER — ONDANSETRON 2 MG/ML
4 INJECTION INTRAMUSCULAR; INTRAVENOUS ONCE AS NEEDED
Status: COMPLETED | OUTPATIENT
Start: 2025-02-02 | End: 2025-02-02

## 2025-02-02 RX ORDER — DIPHENHYDRAMINE HCL 25 MG
25 CAPSULE ORAL EVERY 4 HOURS PRN
Status: DISCONTINUED | OUTPATIENT
Start: 2025-02-02 | End: 2025-02-06 | Stop reason: HOSPADM

## 2025-02-02 RX ORDER — LIDOCAINE HYDROCHLORIDE 10 MG/ML
0.5 INJECTION, SOLUTION INFILTRATION; PERINEURAL ONCE AS NEEDED
Status: DISCONTINUED | OUTPATIENT
Start: 2025-02-02 | End: 2025-02-02 | Stop reason: HOSPADM

## 2025-02-02 RX ORDER — ACETAMINOPHEN 500 MG
1000 TABLET ORAL ONCE
Status: COMPLETED | OUTPATIENT
Start: 2025-02-02 | End: 2025-02-02

## 2025-02-02 RX ORDER — SODIUM CHLORIDE 0.9 % (FLUSH) 0.9 %
10 SYRINGE (ML) INJECTION AS NEEDED
Status: DISCONTINUED | OUTPATIENT
Start: 2025-02-02 | End: 2025-02-02 | Stop reason: HOSPADM

## 2025-02-02 RX ADMIN — HUMAN RHO(D) IMMUNE GLOBULIN 1500 UNITS: 1500 SOLUTION INTRAMUSCULAR; INTRAVENOUS at 09:26

## 2025-02-02 RX ADMIN — ACETAMINOPHEN 1000 MG: 500 TABLET, FILM COATED ORAL at 13:57

## 2025-02-02 RX ADMIN — DARUNAVIR 600 MG: 600 TABLET, FILM COATED ORAL at 17:46

## 2025-02-02 RX ADMIN — ACETAMINOPHEN 1000 MG: 500 TABLET, FILM COATED ORAL at 02:02

## 2025-02-02 RX ADMIN — Medication 999 ML/HR: at 03:14

## 2025-02-02 RX ADMIN — HYDROXYZINE PAMOATE 50 MG: 50 CAPSULE ORAL at 13:57

## 2025-02-02 RX ADMIN — KETOROLAC TROMETHAMINE 30 MG: 30 INJECTION, SOLUTION INTRAMUSCULAR at 04:29

## 2025-02-02 RX ADMIN — SENNOSIDES AND DOCUSATE SODIUM 1 TABLET: 50; 8.6 TABLET ORAL at 08:16

## 2025-02-02 RX ADMIN — DIPHENHYDRAMINE HYDROCHLORIDE 25 MG: 50 INJECTION, SOLUTION INTRAMUSCULAR; INTRAVENOUS at 04:26

## 2025-02-02 RX ADMIN — FAMOTIDINE 20 MG: 10 INJECTION INTRAVENOUS at 02:02

## 2025-02-02 RX ADMIN — Medication 125 ML/HR: at 04:54

## 2025-02-02 RX ADMIN — FENTANYL CITRATE 10 MCG: 50 INJECTION, SOLUTION INTRAMUSCULAR; INTRAVENOUS at 02:48

## 2025-02-02 RX ADMIN — KETOROLAC TROMETHAMINE 15 MG: 15 INJECTION, SOLUTION INTRAMUSCULAR; INTRAVENOUS at 10:38

## 2025-02-02 RX ADMIN — PHENYLEPHRINE HYDROCHLORIDE 100 MCG: 10 INJECTION INTRAVENOUS at 03:34

## 2025-02-02 RX ADMIN — KETOROLAC TROMETHAMINE 15 MG: 15 INJECTION, SOLUTION INTRAMUSCULAR; INTRAVENOUS at 17:47

## 2025-02-02 RX ADMIN — RITONAVIR 100 MG: 100 TABLET, FILM COATED ORAL at 10:42

## 2025-02-02 RX ADMIN — ACETAMINOPHEN 1000 MG: 500 TABLET, FILM COATED ORAL at 22:51

## 2025-02-02 RX ADMIN — ACETAMINOPHEN 1000 MG: 500 TABLET, FILM COATED ORAL at 08:17

## 2025-02-02 RX ADMIN — BUPIVACAINE HYDROCHLORIDE 1.4 ML: 7.5 INJECTION, SOLUTION EPIDURAL; RETROBULBAR at 02:48

## 2025-02-02 RX ADMIN — CEFAZOLIN 2 G: 2 INJECTION, POWDER, FOR SOLUTION INTRAMUSCULAR; INTRAVENOUS at 02:26

## 2025-02-02 RX ADMIN — ZIDOVUDINE 1 MG/KG/HR: 10 INJECTION, SOLUTION INTRAVENOUS at 00:36

## 2025-02-02 RX ADMIN — ONDANSETRON 4 MG: 2 INJECTION INTRAMUSCULAR; INTRAVENOUS at 02:02

## 2025-02-02 RX ADMIN — SENNOSIDES AND DOCUSATE SODIUM 1 TABLET: 50; 8.6 TABLET ORAL at 22:52

## 2025-02-02 RX ADMIN — DARUNAVIR 600 MG: 600 TABLET, FILM COATED ORAL at 10:40

## 2025-02-02 RX ADMIN — DEXAMETHASONE 3 MG: 1.5 TABLET ORAL at 17:51

## 2025-02-02 RX ADMIN — PHENYLEPHRINE HYDROCHLORIDE 100 MCG: 10 INJECTION INTRAVENOUS at 02:53

## 2025-02-02 RX ADMIN — DIPHENHYDRAMINE HYDROCHLORIDE 25 MG: 25 CAPSULE ORAL at 08:16

## 2025-02-02 RX ADMIN — MORPHINE SULFATE 200 MCG: 4 INJECTION, SOLUTION INTRAMUSCULAR; INTRAVENOUS at 02:48

## 2025-02-02 RX ADMIN — RITONAVIR 100 MG: 100 TABLET, FILM COATED ORAL at 22:51

## 2025-02-02 RX ADMIN — EMTRICITABINE AND TENOFOVIR ALAFENAMIDE 1 TABLET: 200; 25 TABLET ORAL at 10:43

## 2025-02-02 RX ADMIN — SERTRALINE HYDROCHLORIDE 200 MG: 50 TABLET, FILM COATED ORAL at 08:18

## 2025-02-02 NOTE — ANESTHESIA PROCEDURE NOTES
Spinal Block      Patient reassessed immediately prior to procedure    Patient location during procedure: OB  Indication:procedure for pain  Performed By  Anesthesiologist: Dennis Carlos MD  CRNA/CAA: Nicky Estrada CRNA  Preanesthetic Checklist  Completed: patient identified, IV checked, site marked, risks and benefits discussed, surgical consent, monitors and equipment checked, pre-op evaluation and timeout performed  Spinal Block Prep:  Patient Position:sitting  Sterile Tech:cap, gloves, mask and sterile barriers  Prep:Chloraprep  Patient Monitoring:continuous pulse oximetry and blood pressure monitoring    Spinal Block Procedure  Approach:midline  Guidance:landmark technique  Location:L3-L4  Needle Type:Pencan  Needle Gauge:24 G  Placement of Spinal needle event:cerebrospinal fluid aspirated  Paresthesia: no  Fluid Appearance:clear  Medications: fentaNYL citrate (PF) (SUBLIMAZE) injection - Intrathecal   10 mcg - 2/2/2025 2:48:00 AM  Morphine sulfate (PF) injection - Spinal   200 mcg - 2/2/2025 2:48:00 AM  bupivacaine PF (MARCAINE) 0.75 % injection - Spinal   1.4 mL - 2/2/2025 2:48:00 AM   Post Assessment  Patient Tolerance:patient tolerated the procedure well with no apparent complications  Complications no

## 2025-02-02 NOTE — ANESTHESIA POSTPROCEDURE EVALUATION
Patient: Negrita Silvestre    Procedure Summary       Date: 25 [facility] / [facility]    Anesthesia Start: 236 Anesthesia Stop: 407    Procedure:  SECTION REPEAT WITH SALPINGECTOMY (Bilateral) Diagnosis:       Previous  section      (Previous  section [Z98.891])    Surgeons: Lianne Laboy MD Provider: Dennis Carlos MD    Anesthesia Type: spinal ASA Status: 3            Anesthesia Type: spinal    Vitals  Vitals Value Taken Time   /62 25 0635   Temp 37.1 °C (98.7 °F) 25 0635   Pulse 96 25 0635   Resp 18 25 0635   SpO2 95 % 25 0635           Anesthesia Post Evaluation

## 2025-02-02 NOTE — H&P
Psychiatric  Obstetric History and Physical    Chief Complaint   Patient presents with    Contractions     Have been recurring for a while now. Worse today. Has been leaking but unsure if urine or vaginal. Good FM.       Subjective     Patient is a 24 y.o. female  currently at 36w4d, who presents with regular uterine contractions and cervical change to 2 cm.    Her prenatal care is complicated by  sexually transmitted disease  congenital HIV, diabetes  GDM A1,  labor , prior   desires repeat , desires sterlization  valid consents currently available, and abnormal amniotic fluid  polydydramnios.  Her previous obstetric/gynecological history is noted for failed tubal.    The following portions of the patients history were reviewed and updated as appropriate: current medications, allergies, past medical history, past surgical history, past family history, past social history, and problem list .       Prenatal Information:  Prenatal Results       Initial Prenatal Labs       Test Value Reference Range Date Time    Hemoglobin  11.3 g/dL 12.0 - 15.9 10/18/24 1355       13.9 g/dL 11.1 - 15.9 24 1032       12.9 g/dL 12.0 - 15.9 24 1135    Hematocrit  33.2 % 34.0 - 46.6 10/18/24 1355       38.5 % 34.0 - 46.6 24 1032       39.2 % 34.0 - 46.6 24 1135    Platelets  234 10*3/mm3 140 - 450 10/18/24 1355       330 x10E3/uL 150 - 450 24 1032       295 10*3/mm3 140 - 450 24 1135    Rubella IgG  1.33 index Immune >0.99 24 1032    Hepatitis B SAg  Negative  Negative 24 1032    Hepatitis C Ab  Non Reactive  Non Reactive 24 1032    RPR  Non Reactive  Non Reactive 24 1032    T. Pallidum Ab   Non Reactive  Non Reactive 24 1240    ABO  A   24 1032    Rh  Negative   24 1032    Antibody Screen  Negative  Negative 24 1032    HIV  Preliminary Reactive  Non Reactive 24 1032    Urine Culture  25,000 CFU/mL Mixed Raisa  Isolated   12/28/24 1855       >100,000 CFU/mL Mixed Raisa Isolated   12/11/24 2003       25,000 CFU/mL Normal Urogenital Raisa   11/16/24 2104       No growth   11/04/24 1736       No growth   10/18/24 1243       Final report   07/11/24 1059    Gonorrhea  Negative  Negative 08/08/24 1641       Negative  Negative 07/11/24 1059    Chlamydia  Negative  Negative 08/08/24 1641       Negative  Negative 07/11/24 1059    TSH        HgB A1c         Varicella IgG        Hemoglobinopathy Fractionation  Comment   07/20/23 1022    Hemoglobinopathy (genetic testing)        Cystic fibrosis   Negative   08/14/23 0857    Spinal muscular atrophy  Positive   08/14/23 0857    Fragile X                  Fetal testing        Test Value Reference Range Date Time    NIPT        MSAFP  *Screen Negative*   09/05/24 1046    AFP-4                  2nd and 3rd Trimester       Test Value Reference Range Date Time    Hemoglobin (repeated)  11.6 g/dL 11.1 - 15.9 12/02/24 1240       10.8 g/dL 12.0 - 15.9 11/04/24 1734    Hematocrit (repeated)  35.1 % 34.0 - 46.6 12/02/24 1240       30.4 % 34.0 - 46.6 11/04/24 1734    Platelets   231 x10E3/uL 150 - 450 12/02/24 1240       226 10*3/mm3 140 - 450 11/04/24 1734       234 10*3/mm3 140 - 450 10/18/24 1355       330 x10E3/uL 150 - 450 07/11/24 1032       295 10*3/mm3 140 - 450 06/26/24 1135    1 hour GTT   134 mg/dL 70 - 139 12/02/24 1240    Antibody Screen (repeated)  Negative  Negative 12/19/24 1021    3rd TM syphilis scrn (repeated)  RPR         3rd TM syphilis scrn (repeated) TP-Ab  Non Reactive  Non Reactive 12/02/24 1240    3rd TM syphilis screen TB-Ab (FTA)  Non Reactive  Non Reactive 12/02/24 1240    Syphilis cascade test TP-Ab (EIA)        Syphilis cascade TPPA        GTT Fasting        GTT 1 Hr        GTT 2 Hr        GTT 3 Hr        Group B Strep  Negative  Negative 01/23/25               Other testing        Test Value Reference Range Date Time    Parvo IgG         CMV IgG                    Drug Screening       Test Value Reference Range Date Time    Amphetamine Screen        Barbiturate Screen        Benzodiazepine Screen        Methadone Screen        Phencyclidine Screen        Opiates Screen        THC Screen        Cocaine Screen        Propoxyphene Screen        Buprenorphine Screen        Methamphetamine Screen        Oxycodone Screen        Tricyclic Antidepressants Screen                  Legend    ^: Historical                          External Prenatal Results       Pregnancy Outside Results - Transcribed From Office Records - See Scanned Records For Details       Test Value Date Time    ABO  A  07/11/24 1032    Rh  Negative  07/11/24 1032    Antibody Screen  Negative  12/19/24 1021       Negative  07/11/24 1032    Varicella IgG       Rubella  1.33 index 07/11/24 1032    Hgb  11.6 g/dL 12/02/24 1240       10.8 g/dL 11/04/24 1734       11.3 g/dL 10/18/24 1355       13.9 g/dL 07/11/24 1032       12.9 g/dL 06/26/24 1135    Hct  35.1 % 12/02/24 1240       30.4 % 11/04/24 1734       33.2 % 10/18/24 1355       38.5 % 07/11/24 1032       39.2 % 06/26/24 1135    HgB A1c        1h GTT  134 mg/dL 12/02/24 1240    3h GTT Fasting       3h GTT 1 hour       3h GTT 2 hour       3h GTT 3 hour        Gonorrhea (discrete)  Negative  08/08/24 1641       Negative  07/11/24 1059    Chlamydia (discrete)  Negative  08/08/24 1641       Negative  07/11/24 1059    RPR  Non Reactive  07/11/24 1032    Syphils cascade: TP-Ab (FTA)  Non Reactive  12/02/24 1240    TP-Ab  Non Reactive  12/02/24 1240    TP-Ab (EIA)       TPPA       HBsAg  Negative  07/11/24 1032    Herpes Simplex Virus PCR       Herpes Simplex VIrus Culture       HIV  Preliminary Reactive  07/11/24 1032    Hep C RNA Quant PCR       Hep C Antibody  Non Reactive  07/11/24 1032    AFP  16.7 ng/mL 09/05/24 1046    NIPT       Cystic Fibrosis ([place])       Cystic Fibroisis        Spinal Muscular atrophy  Positive  08/14/23 0857    Fragile X       Group B Strep   Negative  25     GBS Susceptibility to Clindamycin       GBS Susceptibility to Erythromycin       Fetal Fibronectin  Negative  24 1242    Genetic Testing, Maternal Blood                 Drug Screening       Test Value Date Time    Urine Drug Screen       Amphetamine Screen       Barbiturate Screen       Benzodiazepine Screen       Methadone Screen       Phencyclidine Screen       Opiates Screen       THC Screen       Cocaine Screen       Propoxyphene Screen       Buprenorphine Screen       Methamphetamine Screen       Oxycodone Screen       Tricyclic Antidepressants Screen                 Legend    ^: Historical                             Past OB History:     OB History    Para Term  AB Living   2 1 1 0 0 1   SAB IAB Ectopic Molar Multiple Live Births   0 0 0 0 0 1      # Outcome Date GA Lbr Michael/2nd Weight Sex Type Anes PTL Lv   2 Current            1 Term 02/10/24 38w0d  3960 g (8 lb 11.7 oz) F CS-LTranv Spinal N BAKARI      Birth Comments: panda or 1      Name: Deirdre Miranda      Apgar1: 8  Apgar5: 9       Past Medical History: Past Medical History:   Diagnosis Date    Anxiety     on Zoloft    Asperger's disorder     Depression     Gestational diabetes     diet controlled    HIV (human immunodeficiency virus infection)     congential, on meds    Crescent teeth extracted       Past Surgical History Past Surgical History:   Procedure Laterality Date     SECTION WITH TUBAL N/A 2/10/2024    Procedure:  SECTION PRIMARY WITH TUBAL;  Surgeon: Nancy Sheth MD;  Location:  [facility];  Service: Obstetrics/Gynecology;  Laterality: N/A;    ENDOSCOPY N/A 2024    Procedure: ESOPHAGOGASTRODUODENOSCOPY WITH BIOPSIES;  Surgeon: Josue Wright MD;  Location: [facility];  Service: Gastroenterology;  Laterality: N/A;  esophagitis    EYE SURGERY        Family History: Family History   Adopted: Yes   Problem Relation Age of Onset    Colon cancer Neg  Hx     Colon polyps Neg Hx     Crohn's disease Neg Hx     Irritable bowel syndrome Neg Hx     Ulcerative colitis Neg Hx       Social History:  reports that she has been smoking cigarettes. She started smoking about 6 years ago. She has a 1.5 pack-year smoking history. She has never been exposed to tobacco smoke. She has never used smokeless tobacco.   reports that she does not currently use alcohol.   reports that she does not currently use drugs.        Review of Systems   Constitutional:  Negative for chills, fatigue and fever.   Gastrointestinal:  Positive for abdominal pain.   Genitourinary:  Negative for vaginal bleeding, vaginal discharge and vaginal pain.   All other systems reviewed and are negative.        Objective     Vital Signs Range for the last 24 hours  Temperature: Temp:  [97.9 °F (36.6 °C)] 97.9 °F (36.6 °C)   Temp Source: Temp src: Oral   BP: BP: (111)/(61) 111/61   Pulse: Heart Rate:  [90] 90   Respirations: Resp:  [16] 16   SPO2: SpO2:  [98 %] 98 %   O2 Amount (l/min):     O2 Devices Device (Oxygen Therapy): room air   Weight:       Physical Examination: General appearance - alert, well appearing, and in no distress  Abdomen - gravid, soft, nontender, nondistended, no masses or organomegaly  Musculoskeletal - no joint tenderness, deformity or swelling  Extremities - peripheral pulses normal, no pedal edema, no clubbing or cyanosis  Skin - normal coloration and turgor, no rashes, no suspicious skin lesions noted    Presentation: Vertex    Cervix: Exam by:     Dilation: Cervical Dilation (cm): 2-3   Effacement: Cervical Effacement: 40   Station:       Fetal Heart Rate Assessment   Method: Fetal HR Assessment Method: external   Beats/min: Fetal HR (beats/min): 120   Baseline: Fetal HR Baseline: normal range   Variability: Fetal HR Variability: moderate (amplitude range 6 to 25 bpm)   Accels: Fetal HR Accelerations: greater than/equal to 15 bpm, lasting at least 15 seconds   Decels: Fetal HR  Decelerations: absent   Tracing Category:       Uterine Assessment   Method: Method: external tocotransducer, palpation, per patient report   Frequency (min): Contraction Frequency (Minutes): x1   Ctx Count in 10 min:     Duration: Contraction Duration: 60-90 seconds   Intensity: Contraction Intensity: moderate by palpation   Intensity by IUPC:     Resting Tone: Uterine Resting Tone: soft by palpation   Resting Tone by IUPC:     [place] Units:       Laboratory Results:   Results from last 7 days   Lab Units 25  2303   WBC 10*3/mm3 8.60   HEMOGLOBIN g/dL 11.7*   HEMATOCRIT % 32.6*   PLATELETS 10*3/mm3 209       Results from last 7 days   Lab Units 25  0045   SODIUM mmol/L 135*   POTASSIUM mmol/L 3.4*   CHLORIDE mmol/L 102   CO2 mmol/L 18.8*   BUN mg/dL 7   CREATININE mg/dL 0.46*   CALCIUM mg/dL 8.0*   BILIRUBIN mg/dL <0.2   ALK PHOS U/L 116   ALT (SGPT) U/L 15   AST (SGOT) U/L 13   GLUCOSE mg/dL 100*       Assessment & Plan       Congenital HIV infection    Diet controlled gestational diabetes mellitus (GDM) in third trimester    Maternal anemia in pregnancy, antepartum    Anxiety in pregnancy, antepartum     labor in third trimester    Rh negative status during pregnancy in third trimester    Increased BMI    Encounter for sterilization    Previous  delivery affecting pregnancy    Normal labor    Short interval between pregnancies affecting pregnancy, antepartum    Polyhydramnios in third trimester      Assessment & Plan    Assessment:  1.  Intrauterine pregnancy at 36w4d gestation with reactive, reassuring fetal status.    2.  labor  without ROM  3.  Obstetrical history significant for is remarkable for previous  and failed tubal  4.  GBS status:   Strep Gp B Culture   Date Value Ref Range Status   2025 Negative Negative Final     Comment:     Centers for Disease Control and Prevention (CDC) and American Congress  of Obstetricians and Gynecologists (ACOG) guidelines for  prevention of   group B streptococcal (GBS) disease specify co-collection of  a vaginal and rectal swab specimen to maximize sensitivity of GBS  detection. Per the CDC and ACOG, swabbing both the lower vagina and  rectum substantially increases the yield of detection compared with  sampling the vagina alone.  Penicillin G, ampicillin, or cefazolin are indicated for intrapartum  prophylaxis of  GBS colonization. Reflex susceptibility  testing should be performed prior to use of clindamycin only on GBS  isolates from penicillin-allergic women who are considered a high risk  for anaphylaxis. Treatment with vancomycin without additional testing  is warranted if resistance to clindamycin is noted.     5.  Congenital HIV    Plan:  1.  with Tubal scheduled  2. Plan of care has been reviewed with patient and nurse.  3.  Risks, benefits of treatment plan have been discussed.  4.  All questions have been answered.  5. IV Zidovudine ordered       Lianne Laboy MD  2025  02:17 EST

## 2025-02-02 NOTE — PLAN OF CARE
Goal Outcome Evaluation:  Plan of Care Reviewed With: patient        Progress: improving  Outcome Evaluation: Patient delivered viable male infant via repeat  for HIV. Patient received antiretroviral IV and oral medications before delivery. Patient did well throughout recovery, no complaints of pain, bleeding well controlled. VSS. Infant transferred to NICU after delivery. Patient transferred to mother baby unit in stable condition with family

## 2025-02-02 NOTE — ANESTHESIA PREPROCEDURE EVALUATION
Anesthesia Evaluation     Patient summary reviewed and Nursing notes reviewed   NPO Solid Status: > 6 hours  NPO Liquid Status: > 2 hours           Airway   Mallampati: II  TM distance: >3 FB  Neck ROM: full  Dental - normal exam     Pulmonary    (-) COPD, asthma, not a smoker  Cardiovascular   Exercise tolerance: good (4-7 METS)    (-) past MI, dysrhythmias, angina    ROS comment: TTE 2023 nl LV/RV fxn no significant valve issues     Neuro/Psych  (+) psychiatric history Anxiety and Depression  (-) seizures, CVA  GI/Hepatic/Renal/Endo    (+) morbid obesity, diabetes mellitus gestational  (-) GERD, liver disease, no renal disease, no thyroid disorder    Musculoskeletal     Abdominal    Substance History      OB/GYN    (+) Pregnant        Other          Other Comment: HIV+                    Anesthesia Plan    ASA 3     spinal     (36w5d)    Anesthetic plan, risks, benefits, and alternatives have been provided, discussed and informed consent has been obtained with: patient.        CODE STATUS:    Level Of Support Discussed With: Patient  Code Status (Patient has no pulse and is not breathing): CPR (Attempt to Resuscitate)  Medical Interventions (Patient has pulse or is breathing): Full Support

## 2025-02-02 NOTE — OBED NOTES
[place] [facility] [place] Name: Negrita Silvestre  YOB: 2000  MRN: [id number]  Admission Date: 2025  7:36 PM  Date of Service: 2025    Chief Complaint: Contractions (Have been recurring for a while now. Worse today. Has been leaking but unsure if urine or vaginal. Good FM.)        Subjective     Negrita Silvestre is a 24 y.o. female  at 36w4d with Estimated Date of Delivery: 25 who presents with the chief complaint listed above.  Reports that the contractions have been irregular but some have been as painful as a 10 out of 10 waking her up out of sleep.  Review of last office cervical exam shows that her cervix was closed was closed.  Patient reports she last ate at 730 p.m. chicken nuggets.  Patient is planning on having a repeat  with both tubes totally removed.  It was recommended that for a scheduled  IV ZDV should be given 3 hours before surgery     She sees Nancy Daley MD for her prenatal care. Her pregnancy has been complicated by: Morbid obesity, prior , Rh-, congenital HIV, gestational diabetes diet-controlled, history of anxiety and depression    She describes fetal movement as normal.  She is not sure if rupture of membranes.  She denies vaginal bleeding. She is feeling contractions.        Objective   Patient Active Problem List    Diagnosis     *Previous  section [Z98.891]     Hyperemesis complicating pregnancy, antepartum [O21.0]     Pregnancy [Z34.90]     Previous  delivery affecting pregnancy [O34.219]     Nausea and vomiting of pregnancy, antepartum [O21.9]     Nausea and vomiting [R11.2]     Right upper quadrant abdominal pain [R10.11]     Left upper quadrant abdominal pain [R10.12]      delivery delivered [O82]     Encounter for sterilization [Z30.2]      labor in third trimester [O60.03]     Rh negative status during pregnancy in third trimester [O26.893, Z67.91]     Increased BMI [R63.8]      Maternal anemia in pregnancy, antepartum [O99.019]     Anxiety in pregnancy, antepartum [O99.340, F41.9]     Congenital HIV infection [Z21]     Diet controlled gestational diabetes mellitus (GDM) in third trimester [O24.410]         OB History    Para Term  AB Living   2 1 1 0 0 1   SAB IAB Ectopic Molar Multiple Live Births   0 0 0 0 0 1      # Outcome Date GA Lbr Michael/2nd Weight Sex Type Anes PTL Lv   2 Current            1 Term 02/10/24 38w0d  3960 g (8 lb 11.7 oz) F CS-LTranv Spinal N BAKARI      Birth Comments: panda or 1      Name: Deirdre Miranda      Apgar1: 8  Apgar5: 9        Past Medical History:   Diagnosis Date    Anxiety     on Zoloft    Asperger's disorder     Depression     Gestational diabetes     diet controlled    HIV (human immunodeficiency virus infection)     congential, on meds    [place] teeth extracted        Past Surgical History:   Procedure Laterality Date     SECTION WITH TUBAL N/A 2/10/2024    Procedure:  SECTION PRIMARY WITH TUBAL;  Surgeon: Nancy Sheth MD;  Location: [facility] LABOR DELIVERY;  Service: Obstetrics/Gynecology;  Laterality: N/A;    ENDOSCOPY N/A 2024    Procedure: ESOPHAGOGASTRODUODENOSCOPY WITH BIOPSIES;  Surgeon: Josue Wright MD;  Location: [facility] [place];  Service: Gastroenterology;  Laterality: N/A;  esophagitis    EYE SURGERY         No current facility-administered medications on file prior to encounter.     Current Outpatient Medications on File Prior to Encounter   Medication Sig Dispense Refill    Descovy 200-25 MG per tablet Take 1 tablet by mouth Daily.      ondansetron ODT (ZOFRAN-ODT) 8 MG disintegrating tablet Place 1 tablet on the tongue Every 12 (Twelve) Hours As Needed for Nausea or Vomiting. 30 tablet 1    pantoprazole (PROTONIX) 40 MG EC tablet Take 1 tablet by mouth Daily. 90 tablet 3    Prenatal Vit-Fe Fumarate-FA (Prenatal Vitamin) 27-0.8 MG tablet Take 1 tablet by mouth Daily. 30 tablet 12     Prezista 600 MG tablet TAKE 1 TABLET BY MOUTH TWICE DAILY WITH MEALS 60 tablet 2    promethazine (PHENERGAN) 12.5 MG tablet Take 1 tablet by mouth Every 6 (Six) Hours As Needed for Nausea or Vomiting. 30 tablet 1    ritonavir (NORVIR) 100 MG tablet Take 1 tablet by mouth Every 12 (Twelve) Hours.      sertraline (Zoloft) 100 MG tablet Take 2 tablets by mouth Daily for 364 days. 180 tablet 3       Allergies   Allergen Reactions    Bactrim [Sulfamethoxazole-Trimethoprim] Hives    Sulfa Antibiotics Hives    [place] Rash       Family History   Adopted: Yes   Problem Relation Age of Onset    Colon cancer Neg Hx     Colon polyps Neg Hx     Crohn's disease Neg Hx     Irritable bowel syndrome Neg Hx     Ulcerative colitis Neg Hx        Social History     Socioeconomic History    Marital status:    Tobacco Use    Smoking status: Every Day     Current packs/day: 0.25     Average packs/day: 0.3 packs/day for 6.1 years (1.5 ttl pk-yrs)     Types: Cigarettes     Start date: 2019     Passive exposure: Never    Smokeless tobacco: Never   Vaping Use    Vaping status: Some Days    Last attempt to quit: 5/1/2023    Substances: Nicotine   Substance and Sexual Activity    Alcohol use: Not Currently    Drug use: Not Currently    Sexual activity: Yes     Partners: Male     Birth control/protection: None           Review of Systems   Constitutional:  Negative for chills and fever.   HENT: Negative.     Eyes:  Negative for photophobia and visual disturbance.   Respiratory:  Negative for shortness of breath.    Cardiovascular:  Negative for chest pain.   Gastrointestinal:  Positive for abdominal pain. Negative for nausea.   Genitourinary:  Positive for vaginal discharge.   Psychiatric/Behavioral:  The patient is not nervous/anxious.    All other systems reviewed and are negative.         PHYSICAL EXAM:      VITAL SIGNS:  Vitals:    02/01/25 2005   BP: 111/61   BP Location: Right arm   Patient Position: Lying   Pulse: 90   Resp:  "16   Temp: 97.9 °F (36.6 °C)   TempSrc: Oral   SpO2: 98%   Height: 149.9 cm (59\")            FHT'S:    130 with moderate variability and accelerations                                     PHYSICAL EXAM:      General: well developed; well nourished  no acute distress  mentation appropriate   Heart: Not performed.   Lungs   breathing is unlabored   Abdomen: Gravid and non tender     Extremities: trace edema, DTRs 1 plus, no clonus       Cervix: Per RN, some scant bloody show was noted  Cervical Dilation (cm): 2  Cervical Effacement: 40  Fetal Station: -3  Cervical Consistency: soft  Cervical Position: mid-position     Contractions:   irregular                    LABS AND TESTING ORDERED:  Uterine and fetal monitoring  Urinalysis  ROM plus    LAB RESULTS:    Recent Results (from the past 24 hours)   Urinalysis With Culture If Indicated - Urine, Clean Catch    Collection Time: 02/01/25  7:52 PM    Specimen: Urine, Clean Catch   Result Value Ref Range    Color, UA Yellow Yellow, Straw    Appearance, UA Cloudy (A) Clear    pH, UA 6.5 5.0 - 8.0    Specific Gravity, UA 1.026 1.005 - 1.030    Glucose, UA Negative Negative    Ketones, UA Negative Negative    Bilirubin, UA Negative Negative    Blood, UA Negative Negative    Protein, UA Negative Negative    Leuk Esterase, UA Negative Negative    Nitrite, UA Negative Negative    Urobilinogen, UA 1.0 E.U./dL 0.2 - 1.0 E.U./dL   Rapid Assay For ROM - Amniotic Fluid, Amniotic Sac    Collection Time: 02/01/25  7:53 PM    Specimen: Amniotic Sac; Amniotic Fluid   Result Value Ref Range    Rupture of Membranes Negative Negative       Lab Results   Component Value Date    ABO A 07/11/2024    RH Negative 07/11/2024       Lab Results   Component Value Date    STREPGPB Negative 01/23/2025                 External Prenatal Results       Pregnancy Outside Results - Transcribed From Office Records - See Scanned Records For Details       Test Value Date Time    ABO  A  07/11/24 1032    Rh  " Negative  07/11/24 1032    Antibody Screen  Negative  12/19/24 1021       Negative  07/11/24 1032    Varicella IgG       Rubella  1.33 index 07/11/24 1032    Hgb  11.6 g/dL 12/02/24 1240       10.8 g/dL 11/04/24 1734       11.3 g/dL 10/18/24 1355       13.9 g/dL 07/11/24 1032       12.9 g/dL 06/26/24 1135    Hct  35.1 % 12/02/24 1240       30.4 % 11/04/24 1734       33.2 % 10/18/24 1355       38.5 % 07/11/24 1032       39.2 % 06/26/24 1135    HgB A1c        1h GTT  134 mg/dL 12/02/24 1240    3h GTT Fasting       3h GTT 1 hour       3h GTT 2 hour       3h GTT 3 hour        Gonorrhea (discrete)  Negative  08/08/24 1641       Negative  07/11/24 1059    Chlamydia (discrete)  Negative  08/08/24 1641       Negative  07/11/24 1059    RPR  Non Reactive  07/11/24 1032    Syphils cascade: TP-Ab (FTA)  Non Reactive  12/02/24 1240    TP-Ab  Non Reactive  12/02/24 1240    TP-Ab (EIA)       TPPA       HBsAg  Negative  07/11/24 1032    Herpes Simplex Virus PCR       Herpes Simplex VIrus Culture       HIV  Preliminary Reactive  07/11/24 1032    Hep C RNA Quant PCR       Hep C Antibody  Non Reactive  07/11/24 1032    AFP  16.7 ng/mL 09/05/24 1046    NIPT       Cystic Fibrosis ([place])       Cystic Fibroisis        Spinal Muscular atrophy  Positive  08/14/23 0857    Fragile X       Group B Strep  Negative  01/23/25     GBS Susceptibility to Clindamycin       GBS Susceptibility to Erythromycin       Fetal Fibronectin  Negative  12/20/24 1242    Genetic Testing, Maternal Blood                 Drug Screening       Test Value Date Time    Urine Drug Screen       Amphetamine Screen       Barbiturate Screen       Benzodiazepine Screen       Methadone Screen       Phencyclidine Screen       Opiates Screen       THC Screen       Cocaine Screen       Propoxyphene Screen       Buprenorphine Screen       Methamphetamine Screen       Oxycodone Screen       Tricyclic Antidepressants Screen                 Legend    ^: Historical                               Impression:   @ 36w4d .  Final Diagnosis: Painful contractions with prior , desires a repeat with total removal of tubes    Plan:  1.  Discussed with Dr. Laboy who will admit.      Belkys Hall MD  2025  20:44 EST

## 2025-02-02 NOTE — PLAN OF CARE
Goal Outcome Evaluation:              Outcome Evaluation: vss, pain well controlled with meds, visited baby in nicu, ambulating well

## 2025-02-03 LAB
BACTERIA SPEC AEROBE CULT: NO GROWTH
BASOPHILS # BLD AUTO: 0.01 10*3/MM3 (ref 0–0.2)
BASOPHILS NFR BLD AUTO: 0.1 % (ref 0–1.5)
DEPRECATED RDW RBC AUTO: 43.8 FL (ref 37–54)
EOSINOPHIL # BLD AUTO: 0 10*3/MM3 (ref 0–0.4)
EOSINOPHIL NFR BLD AUTO: 0 % (ref 0.3–6.2)
ERYTHROCYTE [DISTWIDTH] IN BLOOD BY AUTOMATED COUNT: 13.6 % (ref 12.3–15.4)
HCT VFR BLD AUTO: 33.3 % (ref 34–46.6)
HCV AB SER QL: NORMAL
HGB BLD-MCNC: 11.6 G/DL (ref 12–15.9)
IMM GRANULOCYTES # BLD AUTO: 0.09 10*3/MM3 (ref 0–0.05)
IMM GRANULOCYTES NFR BLD AUTO: 0.7 % (ref 0–0.5)
LYMPHOCYTES # BLD AUTO: 1.15 10*3/MM3 (ref 0.7–3.1)
LYMPHOCYTES NFR BLD AUTO: 9.2 % (ref 19.6–45.3)
MCH RBC QN AUTO: 30.9 PG (ref 26.6–33)
MCHC RBC AUTO-ENTMCNC: 34.8 G/DL (ref 31.5–35.7)
MCV RBC AUTO: 88.8 FL (ref 79–97)
MONOCYTES # BLD AUTO: 0.39 10*3/MM3 (ref 0.1–0.9)
MONOCYTES NFR BLD AUTO: 3.1 % (ref 5–12)
NEUTROPHILS NFR BLD AUTO: 10.88 10*3/MM3 (ref 1.7–7)
NEUTROPHILS NFR BLD AUTO: 86.9 % (ref 42.7–76)
NRBC BLD AUTO-RTO: 0 /100 WBC (ref 0–0.2)
PLATELET # BLD AUTO: 234 10*3/MM3 (ref 140–450)
PMV BLD AUTO: 10.8 FL (ref 6–12)
RBC # BLD AUTO: 3.75 10*6/MM3 (ref 3.77–5.28)
TREPONEMA PALLIDUM IGG+IGM AB [PRESENCE] IN SERUM OR PLASMA BY IMMUNOASSAY: NORMAL
WBC NRBC COR # BLD AUTO: 12.52 10*3/MM3 (ref 3.4–10.8)

## 2025-02-03 PROCEDURE — 25010000002 ENOXAPARIN PER 10 MG: Performed by: OBSTETRICS & GYNECOLOGY

## 2025-02-03 PROCEDURE — 0503F POSTPARTUM CARE VISIT: CPT | Performed by: STUDENT IN AN ORGANIZED HEALTH CARE EDUCATION/TRAINING PROGRAM

## 2025-02-03 PROCEDURE — 86803 HEPATITIS C AB TEST: CPT | Performed by: STUDENT IN AN ORGANIZED HEALTH CARE EDUCATION/TRAINING PROGRAM

## 2025-02-03 PROCEDURE — 85025 COMPLETE CBC W/AUTO DIFF WBC: CPT | Performed by: OBSTETRICS & GYNECOLOGY

## 2025-02-03 PROCEDURE — 86778 TOXOPLASMA ANTIBODY IGM: CPT | Performed by: OBSTETRICS & GYNECOLOGY

## 2025-02-03 PROCEDURE — 86777 TOXOPLASMA ANTIBODY: CPT | Performed by: OBSTETRICS & GYNECOLOGY

## 2025-02-03 PROCEDURE — 86780 TREPONEMA PALLIDUM: CPT | Performed by: OBSTETRICS & GYNECOLOGY

## 2025-02-03 RX ORDER — NALOXONE HCL 0.4 MG/ML
0.2 VIAL (ML) INJECTION
Status: DISCONTINUED | OUTPATIENT
Start: 2025-02-03 | End: 2025-02-06 | Stop reason: HOSPADM

## 2025-02-03 RX ORDER — POLYETHYLENE GLYCOL 3350 17 G/17G
17 POWDER, FOR SOLUTION ORAL DAILY PRN
Status: DISCONTINUED | OUTPATIENT
Start: 2025-02-03 | End: 2025-02-06 | Stop reason: HOSPADM

## 2025-02-03 RX ADMIN — DEXAMETHASONE 3 MG: 1.5 TABLET ORAL at 06:37

## 2025-02-03 RX ADMIN — ENOXAPARIN SODIUM 40 MG: 100 INJECTION SUBCUTANEOUS at 18:17

## 2025-02-03 RX ADMIN — SENNOSIDES AND DOCUSATE SODIUM 1 TABLET: 50; 8.6 TABLET ORAL at 08:44

## 2025-02-03 RX ADMIN — SERTRALINE HYDROCHLORIDE 200 MG: 50 TABLET, FILM COATED ORAL at 08:44

## 2025-02-03 RX ADMIN — OXYCODONE HYDROCHLORIDE 10 MG: 10 TABLET ORAL at 14:15

## 2025-02-03 RX ADMIN — DARUNAVIR 600 MG: 600 TABLET, FILM COATED ORAL at 08:44

## 2025-02-03 RX ADMIN — DARUNAVIR 600 MG: 600 TABLET, FILM COATED ORAL at 18:17

## 2025-02-03 RX ADMIN — RITONAVIR 100 MG: 100 TABLET, FILM COATED ORAL at 08:44

## 2025-02-03 RX ADMIN — RITONAVIR 100 MG: 100 TABLET, FILM COATED ORAL at 20:58

## 2025-02-03 RX ADMIN — ACETAMINOPHEN 325MG 650 MG: 325 TABLET ORAL at 19:08

## 2025-02-03 RX ADMIN — ENOXAPARIN SODIUM 40 MG: 100 INJECTION SUBCUTANEOUS at 06:40

## 2025-02-03 RX ADMIN — IBUPROFEN 600 MG: 600 TABLET ORAL at 10:31

## 2025-02-03 RX ADMIN — ACETAMINOPHEN 325MG 650 MG: 325 TABLET ORAL at 13:17

## 2025-02-03 RX ADMIN — EMTRICITABINE AND TENOFOVIR ALAFENAMIDE 1 TABLET: 200; 25 TABLET ORAL at 08:44

## 2025-02-03 RX ADMIN — DEXAMETHASONE 3 MG: 1.5 TABLET ORAL at 13:17

## 2025-02-03 RX ADMIN — SENNOSIDES AND DOCUSATE SODIUM 1 TABLET: 50; 8.6 TABLET ORAL at 20:59

## 2025-02-03 RX ADMIN — POLYETHYLENE GLYCOL 3350 17 G: 17 POWDER, FOR SOLUTION ORAL at 13:17

## 2025-02-03 RX ADMIN — IBUPROFEN 600 MG: 600 TABLET ORAL at 16:16

## 2025-02-03 NOTE — PROGRESS NOTES
2/3/2025    Name:Negrita Silvestre    MR#:[id number]     Progress Note:  Post-Op Day 1 S/P    HD:1    Subjective   24 y.o. yo Female  s/p CS at 36w5d doing well. Pain well controlled. Tolerating regular diet and having flatus. Lochia normal. Baby remains in NICU, but doing well per patient.     Patient Active Problem List   Diagnosis    Congenital HIV infection    Diet controlled gestational diabetes mellitus (GDM) in third trimester    Maternal anemia in pregnancy, antepartum    Anxiety in pregnancy, antepartum     labor in third trimester    Rh negative status during pregnancy in third trimester    Increased BMI    Encounter for sterilization    Nausea and vomiting    Right upper quadrant abdominal pain    Left upper quadrant abdominal pain    Previous  delivery affecting pregnancy    Nausea and vomiting of pregnancy, antepartum    Hyperemesis complicating pregnancy, antepartum    Pregnancy    Short interval between pregnancies affecting pregnancy, antepartum    Polyhydramnios in third trimester    Status post bilateral salpingectomy     delivery delivered        Objective    Vitals  Temp:  Temp:  [97.9 °F (36.6 °C)-98.2 °F (36.8 °C)] 98.2 °F (36.8 °C)  Temp src: Oral  BP:  BP: (105-130)/(62-91) 130/84  Pulse:  Heart Rate:  [72-87] 86  RR:   Resp:  [16-20] 16  Weight:    BMI:  Body mass index is 47.26 kg/m².      General Awake, alert, no distress  Abdomen Soft, non-distended, fundus firm, below umbilicus, appropriately tender  Incision  Intact, no erythema or exudate  Extremities Calves NT bilaterally         I/O last 3 completed shifts:  In: 2275 [I.V.:1275; IV Piggyback:1000]  Out: 2807 [Urine:2200; Blood:607]    LABS:   Lab Results   Component Value Date    WBC 12.52 (H) 2025    HGB 11.6 (L) 2025    HCT 33.3 (L) 2025    MCV 88.8 2025     2025       Infant: male      Assessment   1.  POD 1 from repeat  delivery with  bilateral salpingectomy. Encouraged ambulation and discussed expectations for discharge. Baby still in NICU and patient would like to stay in the hospital with him as long as possible.     Plan: Doing well.  Routine postoperative care      Congenital HIV infection    Diet controlled gestational diabetes mellitus (GDM) in third trimester    Maternal anemia in pregnancy, antepartum    Anxiety in pregnancy, antepartum     labor in third trimester    Rh negative status during pregnancy in third trimester    Increased BMI    Encounter for sterilization    Previous  delivery affecting pregnancy    Short interval between pregnancies affecting pregnancy, antepartum    Polyhydramnios in third trimester    Status post bilateral salpingectomy     delivery delivered      Marycarmen Weaver MD  2/3/2025 18:52 EST

## 2025-02-03 NOTE — PLAN OF CARE
Goal Outcome Evaluation:           Progress: improving  Outcome Evaluation: VSS, pain well controlled, ambulaing to NICU, resting well

## 2025-02-03 NOTE — PLAN OF CARE
Goal Outcome Evaluation:           Progress: improving  Outcome Evaluation: VSS. patient has requested not to take pain medication tonight and states she is not in pain. she has been educated on scheduled medications helping to stay ahead of any potential pain, but still has requested not to be woken up for pain medication. will continue to monitor

## 2025-02-03 NOTE — PROGRESS NOTES
"Discharge Planning Assessment  [facility]     Patient Name: Negrita Silvestre  MRN: [id number]  Today's Date: 2/3/2025    Admit Date: 2/1/2025    Plan: Infant may discharge to mother when medically ready; CSW will follow cord tox. LISA Marrero.   Discharge Needs Assessment    No documentation.                  Discharge Plan       Row Name 02/03/25 1337       Plan    Plan Infant may discharge to mother when medically ready; CSW will follow cord tox. LISA Marrero.    Plan Comments Mother: Negrita Silvestre, MRN: [id number]; infant: Aakash \"Riky\" Nirmala, MRN: [id number]. CSW consulted for \"NICU admission.\" Of note, mother's UDS was not collected on admit. Infant's UDS was missed; cord toxicology sent. CSW met with mother at bedside while MGM was in the room. Mother gave consent for MGM to be present during assessment. Mother verified address, phone number, and insurance. Mother reports she, FOB, and their daughter live with maternal grandparents. MGM and mother report a stable and positive living environment. Mother reports [employer] has not spoken to her about adding infant to health insurance. Mother reports having a car seat, crib/bassinet, clothes, and diapers for infant. Mother and father of infant/ have one other child: 1yr old, who is being cared for by maternal grandpa during hospital stay. Mom reports she has full custody of her daughter. MGM shared, \"I know last time we were here things were crazy. Brandi and her  have really grown up and done a fantastic job with [employer]. Things are much better and more peaceful now.\" CSW stated, she was so happy to hear things are going well! Mother reports, maternal grandparents, father of infant/, Scient[employer] community, and other family members are available for support as needed. Mother reports infant is following up with Dr. Vinson after discharge; mother is comfortable scheduling appointments for infant and has reliable transportation. " Mother is current with WIC and has an appointment to add infant after discharge. CSW spent a while with mother and MGM as they provided life updates since hospital stay last year. CSW explained her role as NICU  and encouraged mother to reach out if needed. CSW provided mother with a packet of resources including: WIC, HANDS, transportation, infant supplies, counseling, online support groups, postpartum mood and anxiety resources, and general community resources. CSW spent time building rapport with mother, and offered validation, support, and encouragement to mother throughout assessment. Mother was polite and appropriate, and denied having unmet needs or concerns at this time. CSW will remain available for psychosocial needs while infant is in the NICU. CSW will follow cord toxicology and complete mandated reporting to CPS if warranted. LISA Marrero.                  Continued Care and Services - Admitted Since 2/1/2025    No active coordination exists for this encounter.          Demographic Summary       Row Name 02/03/25 1336       General Information    Admission Type inpatient    Arrived From home    Referral Source nursing    Reason for Consult other (see comments)    General Information Comments NICU admission                   Functional Status       Row Name 02/03/25 1337       Functional Status, IADL    Medications independent    Meal Preparation independent    Housekeeping independent    Laundry independent    Shopping independent       Mental Status    General Appearance WDL WDL       Mental Status Summary    Recent Changes in Mental Status/Cognitive Functioning no changes                   Psychosocial       Row Name 02/03/25 1337       Behavior WDL    Behavior WDL WDL       Emotion Mood WDL    Emotion/Mood/Affect WDL WDL       Speech WDL    Speech WDL WDL       Perceptual State WDL    Perceptual State WDL WDL       Thought Process WDL    Thought Process WDL WDL       Intellectual  Performance WDL    Intellectual Performance WDL WDL       Coping/Stress    Major Change/Loss/Stressor birth    Patient Personal Strengths future/goal oriented;motivated;positive attitude;strong support system    Sources of Support other family members;parent(s);spouse;Religious/[employer] organization                   Abuse/Neglect       Row Name 02/03/25 4515       Personal Safety    Physical Signs of Abuse Present no                   Legal    No documentation.                  Substance Abuse       Row Name 02/03/25 1337       Substance Use    Substance Use Comment No UDS mom or infant; cord tox sent.                   Patient Forms    No documentation.                     BIBIANA Gomez

## 2025-02-04 LAB
CYTO UR: NORMAL
LAB AP CASE REPORT: NORMAL
LABORATORY COMMENT REPORT: NORMAL
PATH REPORT.FINAL DX SPEC: NORMAL
PATH REPORT.GROSS SPEC: NORMAL
T GONDII IGG SERPL IA-ACNC: <3 IU/ML (ref 0–7.1)
T GONDII IGM SER IA-ACNC: <3 AU/ML (ref 0–7.9)

## 2025-02-04 PROCEDURE — 25010000002 ENOXAPARIN PER 10 MG: Performed by: OBSTETRICS & GYNECOLOGY

## 2025-02-04 RX ADMIN — DARUNAVIR 600 MG: 600 TABLET, FILM COATED ORAL at 18:07

## 2025-02-04 RX ADMIN — RITONAVIR 100 MG: 100 TABLET, FILM COATED ORAL at 08:00

## 2025-02-04 RX ADMIN — RITONAVIR 100 MG: 100 TABLET, FILM COATED ORAL at 20:44

## 2025-02-04 RX ADMIN — SERTRALINE HYDROCHLORIDE 200 MG: 50 TABLET, FILM COATED ORAL at 08:00

## 2025-02-04 RX ADMIN — EMTRICITABINE AND TENOFOVIR ALAFENAMIDE 1 TABLET: 200; 25 TABLET ORAL at 08:00

## 2025-02-04 RX ADMIN — ACETAMINOPHEN 325MG 650 MG: 325 TABLET ORAL at 20:44

## 2025-02-04 RX ADMIN — IBUPROFEN 600 MG: 600 TABLET ORAL at 18:07

## 2025-02-04 RX ADMIN — SENNOSIDES AND DOCUSATE SODIUM 1 TABLET: 50; 8.6 TABLET ORAL at 20:55

## 2025-02-04 RX ADMIN — DARUNAVIR 600 MG: 600 TABLET, FILM COATED ORAL at 08:00

## 2025-02-04 RX ADMIN — IBUPROFEN 600 MG: 600 TABLET ORAL at 12:22

## 2025-02-04 RX ADMIN — SENNOSIDES AND DOCUSATE SODIUM 1 TABLET: 50; 8.6 TABLET ORAL at 08:00

## 2025-02-04 RX ADMIN — ACETAMINOPHEN 325MG 650 MG: 325 TABLET ORAL at 14:26

## 2025-02-04 RX ADMIN — ENOXAPARIN SODIUM 40 MG: 100 INJECTION SUBCUTANEOUS at 20:45

## 2025-02-04 RX ADMIN — IBUPROFEN 600 MG: 600 TABLET ORAL at 06:23

## 2025-02-04 RX ADMIN — POLYETHYLENE GLYCOL 3350 17 G: 17 POWDER, FOR SOLUTION ORAL at 10:37

## 2025-02-04 RX ADMIN — ENOXAPARIN SODIUM 40 MG: 100 INJECTION SUBCUTANEOUS at 06:23

## 2025-02-04 RX ADMIN — ACETAMINOPHEN 325MG 650 MG: 325 TABLET ORAL at 08:00

## 2025-02-04 NOTE — PLAN OF CARE
Problem: Adult Inpatient Plan of Care  Goal: Plan of Care Review  Outcome: Progressing  Flowsheets  Taken 2/3/2025 2225 by Laverne Tom, RN  Progress: improving  Outcome Evaluation: VSS, pain well controlled, pt ambulating well and is bonding with baby well. Pt mom at bedside and is very supportive.  Taken 2025 0643 by Charmaine Tamayo, RN  Plan of Care Reviewed With: patient  Goal: Patient-Specific Goal (Individualized)  Outcome: Progressing  Goal: Absence of Hospital-Acquired Illness or Injury  Outcome: Progressing  Goal: Optimal Comfort and Wellbeing  Outcome: Progressing  Goal: Readiness for Transition of Care  Outcome: Progressing     Problem: Skin Injury Risk Increased  Goal: Skin Health and Integrity  Outcome: Progressing     Problem: Postpartum ( Delivery)  Goal: Successful Parent Role Transition  Outcome: Progressing  Goal: Hemostasis  Outcome: Progressing  Goal: Effective Bowel Elimination  Outcome: Progressing  Goal: Fluid and Electrolyte Balance  Outcome: Progressing  Goal: Absence of Infection Signs and Symptoms  Outcome: Progressing  Goal: Anesthesia/Sedation Recovery  Outcome: Progressing  Goal: Optimal Pain Control and Function  Outcome: Progressing  Goal: Nausea and Vomiting Relief  Outcome: Progressing  Goal: Effective Urinary Elimination  Outcome: Progressing  Goal: Effective Oxygenation and Ventilation  Outcome: Progressing     Problem: Anesthesia/Analgesia, Neuraxial  Goal: Safe, Effective Infusion Delivery  Outcome: Progressing  Goal: Stable Patient-Fetal Status  Outcome: Progressing  Goal: Absence of Infection Signs and Symptoms  Outcome: Progressing  Goal: Nausea and Vomiting Relief  Outcome: Progressing  Goal: Optimal Pain Control and Function  Outcome: Progressing  Goal: Effective Oxygenation and Ventilation  Outcome: Progressing  Goal: Baseline Motor Function Return  Outcome: Progressing  Goal: Effective Urinary Elimination  Outcome: Progressing     Problem:   Delivery  Goal: Bleeding is Controlled  Outcome: Progressing  Goal: Stable Fetal Wellbeing  Outcome: Progressing  Goal: Absence of Infection Signs and Symptoms  Outcome: Progressing  Goal: Effective Oxygenation and Ventilation  Outcome: Progressing     Problem: Fall Injury Risk  Goal: Absence of Fall and Fall-Related Injury  Outcome: Progressing     Problem: Pain Acute  Goal: Optimal Pain Control and Function  Outcome: Progressing     Problem:  Fall Injury Risk  Goal: Absence of Fall, Infant Drop and Related Injury  Outcome: Progressing   Goal Outcome Evaluation:           Progress: improving  Outcome Evaluation: VSS, pain well controlled, pt ambulating well and is bonding with baby well. Pt mom at bedside and is very supportive.

## 2025-02-04 NOTE — PLAN OF CARE
Goal Outcome Evaluation:           Progress: improving  Outcome Evaluation: VSS, pain well controlled, ambulating well, visits NICU, resting well

## 2025-02-04 NOTE — PROGRESS NOTES
" POSTPARTUM ROUNDS    SUBJECTIVE:    CC:  POD2 from CS    Subjective:  Pt is doing well, pain is well controlled, pt is ambulating and tolerating a regular diet.  Voiding well.  Complains of constipation, +flatus.    Review of Systems - Negative except constipation  NO FEVER OR CHILLS , NO NAUSEA OR VOMITING, NO LEG PAIN    OBJECTIVE:  Vital Signs: /71 (BP Location: Left arm, Patient Position: Lying)   Pulse 57   Temp 98.1 °F (36.7 °C) (Oral)   Resp 16   Ht 149.9 cm (59\")   LMP 2024 (Approximate)   SpO2 96%   Breastfeeding No   BMI 47.26 kg/m²     Intake/Output Summary (Last 24 hours) at 2025 1029  Last data filed at 2/3/2025 1313  Gross per 24 hour   Intake 630 ml   Output 250 ml   Net 380 ml       Constitutional: The patient is well nourished. Alert and oriented.  Mental status is upbeat, no signs postpartum depression.   Cardiovascular:RRR  Resp: nonlabored breathing  The incision is  clean, dry and Intact   Gastrointestinal: fundus firm below umbilicus  Extremities:no edema, non-tender bilateral    LABS / IMAGING:  Lab Results (last 24 hours)       Procedure Component Value Units Date/Time    Toxoplasma Antibodies IgG / IgM [[id number]] Collected: 25 0609    Specimen: Blood Updated: 25 0710     Toxoplasma gondii IgG <3.0 IU/mL      Comment:                                  Negative        <7.2                                   Equivocal  7.2 - 8.7                                   Positive        >8.7        Toxoplasma gondii IgM <3.0 AU/mL      Comment:                              Negative            <8.0                               Equivocal      8.0 - 9.9                               Positive            >9.9        Comment Comment     Comment: No serological evidence of infection with Toxoplasma. If symptoms  persist, submit a new specimen after three weeks.       Narrative:      Performed at:  [address] [place] [address]  [id number]  Lab " Director: Rubin Riley PhD, Phone:  [phone]    Hepatitis C Antibody [[id number]]  (Normal) Collected: 25 1714    Specimen: Blood Updated: 25 1751     Hepatitis C Ab Non-Reactive    Treponema pallidum AB w/Reflex RPR [[id number]]  (Normal) Collected: 25 1101    Specimen: Blood Updated: 25 1219     Treponemal AB Total Non-Reactive    Narrative:      Reactive results will reflex RPR testing.            ASSESSMENT AND PLAN:    POD 2 from  delivery:    Patient Active Problem List   Diagnosis    Congenital HIV infection    Diet controlled gestational diabetes mellitus (GDM) in third trimester    Maternal anemia in pregnancy, antepartum    Anxiety in pregnancy, antepartum     labor in third trimester    Rh negative status during pregnancy in third trimester    Increased BMI    Encounter for sterilization    Nausea and vomiting    Right upper quadrant abdominal pain    Left upper quadrant abdominal pain    Previous  delivery affecting pregnancy    Nausea and vomiting of pregnancy, antepartum    Hyperemesis complicating pregnancy, antepartum    Pregnancy    Short interval between pregnancies affecting pregnancy, antepartum    Polyhydramnios in third trimester    Status post bilateral salpingectomy     delivery delivered       Patient is postop day 2 from LTCS  Patient is doing well   Encourage ambulation   If baby is discharged from NICU tomorrow pt will want to go home.     Nancy Daley MD    2025  10:29 EST

## 2025-02-05 LAB
GLUCOSE BLDC GLUCOMTR-MCNC: 120 MG/DL (ref 70–130)
HIV1 RNA # SERPL NAA+PROBE: <20 COPIES/ML
HIV1 RNA SERPL NAA+PROBE-LOG#: NORMAL LOG10COPY/ML

## 2025-02-05 PROCEDURE — 25010000002 ENOXAPARIN PER 10 MG: Performed by: OBSTETRICS & GYNECOLOGY

## 2025-02-05 PROCEDURE — 82948 REAGENT STRIP/BLOOD GLUCOSE: CPT

## 2025-02-05 PROCEDURE — 63710000001 ONDANSETRON ODT 4 MG TABLET DISPERSIBLE: Performed by: OBSTETRICS & GYNECOLOGY

## 2025-02-05 RX ORDER — NIFEDIPINE 30 MG/1
30 TABLET, EXTENDED RELEASE ORAL
Status: DISCONTINUED | OUTPATIENT
Start: 2025-02-05 | End: 2025-02-06 | Stop reason: HOSPADM

## 2025-02-05 RX ADMIN — ENOXAPARIN SODIUM 40 MG: 100 INJECTION SUBCUTANEOUS at 22:16

## 2025-02-05 RX ADMIN — SENNOSIDES AND DOCUSATE SODIUM 1 TABLET: 50; 8.6 TABLET ORAL at 22:15

## 2025-02-05 RX ADMIN — DARUNAVIR 600 MG: 600 TABLET, FILM COATED ORAL at 22:15

## 2025-02-05 RX ADMIN — RITONAVIR 100 MG: 100 TABLET, FILM COATED ORAL at 09:13

## 2025-02-05 RX ADMIN — ACETAMINOPHEN 325MG 650 MG: 325 TABLET ORAL at 23:31

## 2025-02-05 RX ADMIN — EMTRICITABINE AND TENOFOVIR ALAFENAMIDE 1 TABLET: 200; 25 TABLET ORAL at 09:13

## 2025-02-05 RX ADMIN — IBUPROFEN 600 MG: 600 TABLET ORAL at 14:40

## 2025-02-05 RX ADMIN — ENOXAPARIN SODIUM 40 MG: 100 INJECTION SUBCUTANEOUS at 11:15

## 2025-02-05 RX ADMIN — ACETAMINOPHEN 325MG 650 MG: 325 TABLET ORAL at 02:45

## 2025-02-05 RX ADMIN — DARUNAVIR 600 MG: 600 TABLET, FILM COATED ORAL at 09:13

## 2025-02-05 RX ADMIN — IBUPROFEN 600 MG: 600 TABLET ORAL at 06:21

## 2025-02-05 RX ADMIN — ONDANSETRON 4 MG: 4 TABLET, ORALLY DISINTEGRATING ORAL at 20:35

## 2025-02-05 RX ADMIN — IBUPROFEN 600 MG: 600 TABLET ORAL at 22:15

## 2025-02-05 RX ADMIN — ACETAMINOPHEN 325MG 650 MG: 325 TABLET ORAL at 17:35

## 2025-02-05 RX ADMIN — NIFEDIPINE 30 MG: 30 TABLET, EXTENDED RELEASE ORAL at 00:24

## 2025-02-05 RX ADMIN — IBUPROFEN 600 MG: 600 TABLET ORAL at 00:24

## 2025-02-05 RX ADMIN — SENNOSIDES AND DOCUSATE SODIUM 1 TABLET: 50; 8.6 TABLET ORAL at 09:13

## 2025-02-05 RX ADMIN — OXYCODONE HYDROCHLORIDE 10 MG: 10 TABLET ORAL at 23:27

## 2025-02-05 RX ADMIN — RITONAVIR 100 MG: 100 TABLET, FILM COATED ORAL at 22:15

## 2025-02-05 RX ADMIN — SERTRALINE HYDROCHLORIDE 200 MG: 50 TABLET, FILM COATED ORAL at 09:13

## 2025-02-05 NOTE — PROGRESS NOTES
[facility]   Obstetrics and Gynecology     2025    Name:Negrita Silvestre    MR#:[id number]     Progress Note:  Post-Op    HD:3    Subjective   24 y.o. yo Female  s/p CS at 36w5d doing well. Pain well controlled. Tolerating regular diet and having flatus. Lochia normal.     Patient Active Problem List   Diagnosis    Congenital HIV infection    Diet controlled gestational diabetes mellitus (GDM) in third trimester    Maternal anemia in pregnancy, antepartum    Anxiety in pregnancy, antepartum     labor in third trimester    Rh negative status during pregnancy in third trimester    Increased BMI    Encounter for sterilization    Nausea and vomiting    Right upper quadrant abdominal pain    Left upper quadrant abdominal pain    Previous  delivery affecting pregnancy    Nausea and vomiting of pregnancy, antepartum    Hyperemesis complicating pregnancy, antepartum    Pregnancy    Short interval between pregnancies affecting pregnancy, antepartum    Polyhydramnios in third trimester    Status post bilateral salpingectomy     delivery delivered        Objective    Vitals  Temp:  Temp:  [97.5 °F (36.4 °C)-98.6 °F (37 °C)] 97.5 °F (36.4 °C)  Temp src: Oral  BP:  BP: ()/(59-95) 99/59  Pulse:  Heart Rate:  [55-71] 58  RR:   Resp:  [16] 16  Weight:    BMI:  Body mass index is 47.26 kg/m².    Physical Exam  Vitals and nursing note reviewed.   Constitutional:       Appearance: Normal appearance. She is obese.   Pulmonary:      Effort: Pulmonary effort is normal.   Neurological:      Mental Status: She is alert and oriented to person, place, and time.   Psychiatric:         Mood and Affect: Mood normal.         Behavior: Behavior normal.         No intake/output data recorded.    LABS:  Results from last 7 days   Lab Units 25  0609 25  2303   WBC 10*3/mm3 12.52* 8.60   HEMOGLOBIN g/dL 11.6* 11.7*   HEMATOCRIT % 33.3* 32.6*   PLATELETS 10*3/mm3  234 209     Results from last 7 days   Lab Units 25  0045   SODIUM mmol/L 135*   POTASSIUM mmol/L 3.4*   CHLORIDE mmol/L 102   CO2 mmol/L 18.8*   BUN mg/dL 7   CREATININE mg/dL 0.46*   CALCIUM mg/dL 8.0*   BILIRUBIN mg/dL <0.2   ALK PHOS U/L 116   ALT (SGPT) U/L 15   AST (SGOT) U/L 13   GLUCOSE mg/dL 100*       Infant: male      Assessment    1.  POD#3     Congenital HIV infection    Diet controlled gestational diabetes mellitus (GDM) in third trimester    Maternal anemia in pregnancy, antepartum    Anxiety in pregnancy, antepartum     labor in third trimester    Rh negative status during pregnancy in third trimester    Increased BMI    Encounter for sterilization    Previous  delivery affecting pregnancy    Short interval between pregnancies affecting pregnancy, antepartum    Polyhydramnios in third trimester    Status post bilateral salpingectomy     delivery delivered      Plan:  Continue routine postoperative care   Continue Lovenox for DVT prophylaxis  Male infant stable in NICU-plan is for discharge for both tomorrow    Lianne Laboy MD  2025 11:04 EST

## 2025-02-05 NOTE — NURSING NOTE
RN at bedside to perform Head To Toe assessment. Patient refused at this time and will call when she wakes up. Will continue to check with hourly rounding.

## 2025-02-05 NOTE — NURSING NOTE
RN hourly rounding and medication administration. Patient sleep interrupted. Agreed to all PO meds, but refused lovenox injection and assessment until she wakes up. Will recheck in 1 hour

## 2025-02-05 NOTE — PLAN OF CARE
Goal Outcome Evaluation:         Was started on procardia tonight, had 2 elevated Bps, voids without diff, passing flatus, incision looks good, pain controlled, minimal bleeding

## 2025-02-06 ENCOUNTER — APPOINTMENT (OUTPATIENT)
Dept: CARDIOLOGY | Facility: HOSPITAL | Age: 25
End: 2025-02-06
Payer: COMMERCIAL

## 2025-02-06 VITALS
HEART RATE: 75 BPM | HEIGHT: 59 IN | OXYGEN SATURATION: 98 % | TEMPERATURE: 97.9 F | BODY MASS INDEX: 47.26 KG/M2 | DIASTOLIC BLOOD PRESSURE: 74 MMHG | SYSTOLIC BLOOD PRESSURE: 113 MMHG | RESPIRATION RATE: 16 BRPM

## 2025-02-06 LAB

## 2025-02-06 PROCEDURE — 93970 EXTREMITY STUDY: CPT | Performed by: SURGERY

## 2025-02-06 PROCEDURE — 93970 EXTREMITY STUDY: CPT

## 2025-02-06 RX ORDER — OXYCODONE HYDROCHLORIDE 5 MG/1
5 TABLET ORAL EVERY 6 HOURS PRN
Qty: 12 TABLET | Refills: 0 | Status: SHIPPED | OUTPATIENT
Start: 2025-02-06

## 2025-02-06 RX ORDER — ACETAMINOPHEN 500 MG
500 TABLET ORAL EVERY 6 HOURS PRN
Qty: 40 TABLET | Refills: 1 | Status: SHIPPED | OUTPATIENT
Start: 2025-02-06

## 2025-02-06 RX ORDER — IBUPROFEN 600 MG/1
600 TABLET, FILM COATED ORAL EVERY 6 HOURS PRN
Qty: 40 TABLET | Refills: 1 | Status: SHIPPED | OUTPATIENT
Start: 2025-02-06

## 2025-02-06 RX ADMIN — RITONAVIR 100 MG: 100 TABLET, FILM COATED ORAL at 08:16

## 2025-02-06 RX ADMIN — SENNOSIDES AND DOCUSATE SODIUM 1 TABLET: 50; 8.6 TABLET ORAL at 08:16

## 2025-02-06 RX ADMIN — DARUNAVIR 600 MG: 600 TABLET, FILM COATED ORAL at 08:16

## 2025-02-06 RX ADMIN — EMTRICITABINE AND TENOFOVIR ALAFENAMIDE 1 TABLET: 200; 25 TABLET ORAL at 08:17

## 2025-02-06 RX ADMIN — SERTRALINE HYDROCHLORIDE 200 MG: 50 TABLET, FILM COATED ORAL at 08:15

## 2025-02-06 RX ADMIN — IBUPROFEN 600 MG: 600 TABLET ORAL at 03:32

## 2025-02-06 RX ADMIN — NIFEDIPINE 30 MG: 30 TABLET, EXTENDED RELEASE ORAL at 08:16

## 2025-02-06 NOTE — PLAN OF CARE
Problem: Adult Inpatient Plan of Care  Goal: Plan of Care Review  Outcome: Progressing  Flowsheets  Taken 2/5/2025 1941 by Tatum Isabel RN  Progress: improving  Plan of Care Reviewed With:   patient   family  Taken 2/4/2025 0901 by Jennie Garza RN  Outcome Evaluation: VSS, pain well controlled, ambulating well, visits NICU, resting well  Goal: Patient-Specific Goal (Individualized)  Outcome: Progressing  Goal: Absence of Hospital-Acquired Illness or Injury  Outcome: Progressing  Intervention: Identify and Manage Fall Risk  Recent Flowsheet Documentation  Taken 2/5/2025 1900 by Tatum Isabel RN  Safety Promotion/Fall Prevention: safety round/check completed  Taken 2/5/2025 1800 by Tatum Isabel RN  Safety Promotion/Fall Prevention: safety round/check completed  Taken 2/5/2025 1600 by Tatum Isabel RN  Safety Promotion/Fall Prevention: safety round/check completed  Taken 2/5/2025 1400 by Tatum Isabel RN  Safety Promotion/Fall Prevention: safety round/check completed  Taken 2/5/2025 1200 by Tatum Isabel RN  Safety Promotion/Fall Prevention: safety round/check completed  Taken 2/5/2025 1000 by Tatum Isabel RN  Safety Promotion/Fall Prevention: safety round/check completed  Taken 2/5/2025 0800 by Tatum Isabel RN  Safety Promotion/Fall Prevention: safety round/check completed  Intervention: Prevent Skin Injury  Recent Flowsheet Documentation  Taken 2/5/2025 1116 by Tatum Isabel RN  Body Position: sitting up in bed  Intervention: Prevent and Manage VTE (Venous Thromboembolism) Risk  Recent Flowsheet Documentation  Taken 2/5/2025 1116 by Tatum Isabel RN  VTE Prevention/Management: (ambulates to NICU often) SCDs (sequential compression devices) off  Goal: Optimal Comfort and Wellbeing  Outcome: Progressing  Intervention: Monitor Pain and Promote Comfort  Recent Flowsheet Documentation  Taken 2/5/2025 1732 by Tatum Isabel RN  Pain Management Interventions: pain medication  given  Taken 2025 1116 by Tatum Isabel RN  Pain Management Interventions:   cold applied   pain management plan reviewed with patient/caregiver  Intervention: Provide Person-Centered Care  Recent Flowsheet Documentation  Taken 2025 1116 by Tatum Isabel RN  Trust Relationship/Rapport:   care explained   choices provided   questions answered   thoughts/feelings acknowledged  Goal: Readiness for Transition of Care  Outcome: Progressing     Problem: Skin Injury Risk Increased  Goal: Skin Health and Integrity  Outcome: Progressing  Intervention: Optimize Skin Protection  Recent Flowsheet Documentation  Taken 2025 1116 by Tatum Isabel RN  Activity Management: up ad paula  Head of Bed (HOB) Positioning: HOB elevated     Problem: Postpartum ( Delivery)  Goal: Successful Parent Role Transition  Outcome: Progressing  Intervention: Support Parent Role Transition  Recent Flowsheet Documentation  Taken 2025 1116 by Tatum Isabel RN  Supportive Measures: active listening utilized  Parent-Child Attachment Promotion: rooming-in promoted  Goal: Hemostasis  Outcome: Progressing  Goal: Effective Bowel Elimination  Outcome: Progressing  Goal: Fluid and Electrolyte Balance  Outcome: Progressing  Goal: Absence of Infection Signs and Symptoms  Outcome: Progressing  Goal: Anesthesia/Sedation Recovery  Outcome: Progressing  Intervention: Optimize Anesthesia Recovery  Recent Flowsheet Documentation  Taken 2025 1900 by Tatum Isabel RN  Safety Promotion/Fall Prevention: safety round/check completed  Taken 2025 1800 by Tatum Isabel RN  Safety Promotion/Fall Prevention: safety round/check completed  Taken 2025 1600 by Tatum Isabel RN  Safety Promotion/Fall Prevention: safety round/check completed  Taken 2025 1400 by Tatum Isabel RN  Safety Promotion/Fall Prevention: safety round/check completed  Taken 2025 1200 by Tatum Isabel RN  Safety Promotion/Fall Prevention:  safety round/check completed  Taken 2/5/2025 1000 by Tatum Isabel RN  Safety Promotion/Fall Prevention: safety round/check completed  Taken 2/5/2025 0800 by Tatum Isabel RN  Safety Promotion/Fall Prevention: safety round/check completed  Goal: Optimal Pain Control and Function  Outcome: Progressing  Intervention: Prevent or Manage Pain  Recent Flowsheet Documentation  Taken 2/5/2025 1732 by Tatum Isabel RN  Pain Management Interventions: pain medication given  Taken 2/5/2025 1116 by Tatum Isabel RN  Perineal Care:   absorbent brief/pad changed   perineum cleansed  Pain Management Interventions:   cold applied   pain management plan reviewed with patient/caregiver  Goal: Nausea and Vomiting Relief  Outcome: Progressing  Goal: Effective Urinary Elimination  Outcome: Progressing  Goal: Effective Oxygenation and Ventilation  Outcome: Progressing  Intervention: Optimize Oxygenation and Ventilation  Recent Flowsheet Documentation  Taken 2/5/2025 1116 by Tatum Isabel RN  Head of Bed (HOB) Positioning: HOB elevated     Problem: Anesthesia/Analgesia, Neuraxial  Goal: Safe, Effective Infusion Delivery  Outcome: Progressing  Goal: Stable Patient-Fetal Status  Outcome: Progressing  Goal: Absence of Infection Signs and Symptoms  Outcome: Progressing  Goal: Nausea and Vomiting Relief  Outcome: Progressing  Goal: Optimal Pain Control and Function  Outcome: Progressing  Intervention: Prevent or Manage Pain  Recent Flowsheet Documentation  Taken 2/5/2025 1732 by Tatum Isabel RN  Pain Management Interventions: pain medication given  Taken 2/5/2025 1116 by Tatum Isabel RN  Pain Management Interventions:   cold applied   pain management plan reviewed with patient/caregiver  Diversional Activities: television  Goal: Effective Oxygenation and Ventilation  Outcome: Progressing  Intervention: Optimize Oxygenation and Ventilation  Recent Flowsheet Documentation  Taken 2/5/2025 1116 by Tatum Isabel RN  Body  Position: sitting up in bed  Head of Bed (HOB) Positioning: HOB elevated  Goal: Baseline Motor Function Return  Outcome: Progressing  Intervention: Optimize Sensorimotor Function and Safety  Recent Flowsheet Documentation  Taken 2025 1900 by Tatum Isabel RN  Safety Promotion/Fall Prevention: safety round/check completed  Taken 2025 1800 by Tatum Isabel RN  Safety Promotion/Fall Prevention: safety round/check completed  Taken 2025 1600 by Tatum Isabel RN  Safety Promotion/Fall Prevention: safety round/check completed  Taken 2025 1400 by Tatum Isabel RN  Safety Promotion/Fall Prevention: safety round/check completed  Taken 2025 1200 by Tatum Isabel RN  Safety Promotion/Fall Prevention: safety round/check completed  Taken 2025 1000 by Tatum Isabel RN  Safety Promotion/Fall Prevention: safety round/check completed  Taken 2025 0800 by Tatum Isabel RN  Safety Promotion/Fall Prevention: safety round/check completed  Goal: Effective Urinary Elimination  Outcome: Progressing     Problem:  Delivery  Goal: Bleeding is Controlled  Outcome: Progressing  Goal: Stable Fetal Wellbeing  Outcome: Progressing  Intervention: Promote and Monitor Fetal Wellbeing  Recent Flowsheet Documentation  Taken 2025 1116 by Tatum Isabel RN  Body Position: sitting up in bed  Goal: Absence of Infection Signs and Symptoms  Outcome: Progressing  Goal: Effective Oxygenation and Ventilation  Outcome: Progressing     Problem: Fall Injury Risk  Goal: Absence of Fall and Fall-Related Injury  Outcome: Progressing  Intervention: Identify and Manage Contributors  Recent Flowsheet Documentation  Taken 2025 1116 by Tatum Isabel RN  Medication Review/Management: medications reviewed  Intervention: Promote Injury-Free Environment  Recent Flowsheet Documentation  Taken 2025 1900 by Tatum Isabel RN  Safety Promotion/Fall Prevention: safety round/check completed  Taken 2025  1800 by Tatum Isabel RN  Safety Promotion/Fall Prevention: safety round/check completed  Taken 2025 1600 by Tatum Isabel RN  Safety Promotion/Fall Prevention: safety round/check completed  Taken 2025 1400 by Tatum Isabel RN  Safety Promotion/Fall Prevention: safety round/check completed  Taken 2025 1200 by Tatum Isabel RN  Safety Promotion/Fall Prevention: safety round/check completed  Taken 2025 1000 by Tatum Isabel RN  Safety Promotion/Fall Prevention: safety round/check completed  Taken 2025 0800 by Tatum Isabel RN  Safety Promotion/Fall Prevention: safety round/check completed     Problem: Pain Acute  Goal: Optimal Pain Control and Function  Outcome: Progressing  Intervention: Optimize Psychosocial Wellbeing  Recent Flowsheet Documentation  Taken 2025 1116 by Tatum Isabel RN  Supportive Measures: active listening utilized  Diversional Activities: television  Intervention: Develop Pain Management Plan  Recent Flowsheet Documentation  Taken 2025 1732 by Tatum Isabel RN  Pain Management Interventions: pain medication given  Taken 2025 1116 by Tatum Isabel RN  Pain Management Interventions:   cold applied   pain management plan reviewed with patient/caregiver  Intervention: Prevent or Manage Pain  Recent Flowsheet Documentation  Taken 2025 1116 by Tatum Isabel RN  Medication Review/Management: medications reviewed     Problem:  Fall Injury Risk  Goal: Absence of Fall, Infant Drop and Related Injury  Outcome: Progressing  Intervention: Identify and Manage Contributors  Recent Flowsheet Documentation  Taken 2025 1116 by Tatum Isabel RN  Medication Review/Management: medications reviewed  Intervention: Promote Injury-Free Environment  Recent Flowsheet Documentation  Taken 2025 1900 by Tatum Isabel RN  Safety Promotion/Fall Prevention: safety round/check completed  Taken 2025 1800 by Tatum Isabel RN  Safety  Promotion/Fall Prevention: safety round/check completed  Taken 2/5/2025 1600 by Tatum Isabel RN  Safety Promotion/Fall Prevention: safety round/check completed  Taken 2/5/2025 1400 by Tatum Isabel RN  Safety Promotion/Fall Prevention: safety round/check completed  Taken 2/5/2025 1200 by Tatum Isabel RN  Safety Promotion/Fall Prevention: safety round/check completed  Taken 2/5/2025 1000 by Tatum Isabel RN  Safety Promotion/Fall Prevention: safety round/check completed  Taken 2/5/2025 0800 by Tatum Isabel RN  Safety Promotion/Fall Prevention: safety round/check completed   Goal Outcome Evaluation:  Plan of Care Reviewed With: patient, family        Progress: improving

## 2025-02-06 NOTE — PROGRESS NOTES
Problem: Adult Inpatient Plan of Care  Goal: Plan of Care Review  Outcome: Ongoing, Progressing  Goal: Patient-Specific Goal (Individualized)  Outcome: Ongoing, Progressing  Goal: Absence of Hospital-Acquired Illness or Injury  Outcome: Ongoing, Progressing  Goal: Optimal Comfort and Wellbeing  Outcome: Ongoing, Progressing  Goal: Readiness for Transition of Care  Outcome: Ongoing, Progressing      BL LE dopplers negative, can go home     La Mcdonnell MD  2/6/2025  11:41 EST

## 2025-02-06 NOTE — DISCHARGE SUMMARY
section Discharge Summary    Date of Admission: 2025  Date of Discharge:  2025      Patient: Negrita Silvestre      MR#:[id number]    Surgeon/OB: Lianne Laboy MD    Discharge Diagnosis:  section at 36w5d, uncomplicated recovery    Procedures:  , Low Transverse    2025   3:12 AM     Anesthesia:  Spinal    Presenting Problem/History of Present Illness  Previous  section [Z98.891]  Previous  delivery affecting pregnancy [O34.219]  S/P  section [Z98.891]     Patient Active Problem List   Diagnosis    Congenital HIV infection    Diet controlled gestational diabetes mellitus (GDM) in third trimester    Maternal anemia in pregnancy, antepartum    Anxiety in pregnancy, antepartum     labor in third trimester    Rh negative status during pregnancy in third trimester    Increased BMI    Encounter for sterilization    Nausea and vomiting    Right upper quadrant abdominal pain    Left upper quadrant abdominal pain    Previous  delivery affecting pregnancy    Nausea and vomiting of pregnancy, antepartum    Hyperemesis complicating pregnancy, antepartum    Pregnancy    Short interval between pregnancies affecting pregnancy, antepartum    Polyhydramnios in third trimester    Status post bilateral salpingectomy     delivery delivered       Hospital Course  Patient is a 24 y.o. female  at 36w5d status post  section with uneventful postoperative recovery.  Patient was advanced to regular diet on postoperative day#1.  On discharge, ambulating, tolerating a regular diet without any difficulties and her incision is dry, clean and intact.     Infant:   male fetus 4010 g (8 lb 13.5 oz) with Apgar scores of 7 , 8  at five minutes.    Condition on Discharge:  Stable    Vital Signs  Temp:  [97.7 °F (36.5 °C)-98.3 °F (36.8 °C)] 97.9 °F (36.6 °C)  Heart Rate:  [64-90] 75  Resp:  [16-20] 16  BP: (105-130)/(64-83) 113/74    Lab  Results   Component Value Date    WBC 12.52 (H) 02/03/2025    HGB 11.6 (L) 02/03/2025    HCT 33.3 (L) 02/03/2025    MCV 88.8 02/03/2025     02/03/2025       Discharge Disposition  Home or Self Care    Discharge Medications     Discharge Medications        New Medications        Instructions Start Date   acetaminophen 500 MG tablet  Commonly known as: TYLENOL   500 mg, Oral, Every 6 Hours PRN      ibuprofen 600 MG tablet  Commonly known as: ADVIL,MOTRIN   600 mg, Oral, Every 6 Hours PRN      oxyCODONE 5 MG immediate release tablet  Commonly known as: Roxicodone   5 mg, Oral, Every 6 Hours PRN             Continue These Medications        Instructions Start Date   Descovy 200-25 MG per tablet  Generic drug: Emtricitabine-Tenofovir AF   1 tablet, Daily      diphenhydrAMINE 25 MG tablet  Commonly known as: Benadryl Allergy   25 mg, Oral, Nightly PRN      ondansetron ODT 8 MG disintegrating tablet  Commonly known as: ZOFRAN-ODT   8 mg, Translingual, Every 12 Hours PRN      pantoprazole 40 MG EC tablet  Commonly known as: PROTONIX   40 mg, Oral, Daily      Prenatal Vitamin 27-0.8 MG tablet   1 tablet, Oral, Daily      Prezista 600 MG tablet  Generic drug: Darunavir   600 mg, Oral, 2 Times Daily With Meals      promethazine 12.5 MG tablet  Commonly known as: PHENERGAN   12.5 mg, Oral, Every 6 Hours PRN      ritonavir 100 MG tablet  Commonly known as: NORVIR   1 tablet, Every 12 Hours Scheduled      sertraline 100 MG tablet  Commonly known as: Zoloft   200 mg, Oral, Daily               Discharge Diet: Regular    Follow-up Appointments  Future Appointments   Date Time Provider Department Center   2/7/2025  9:30 AM [facility]   2/7/2025 10:00 AM Nancy Daley MD [facility]   3/3/2025  1:15 PM Nancy Daley MD [facility]   3/12/2025 10:50 AM Guanaco Du DO [facility]     Additional Instructions for the Follow-ups that You Need to Schedule       Call MD With Problems /  Concerns   As directed      If you have newly increased incisional pain, drainage or bleeding from the incision, skin redness around the incision  If you have heavy bleeding, soaking through a pad per hour or passing large clots  If you have persistent headache, visual changes, upper abdominal pain  If you have persistent nausea, vomiting  If your pain is severe and uncontrollable    Order Comments: If you have newly increased incisional pain, drainage or bleeding from the incision, skin redness around the incision If you have heavy bleeding, soaking through a pad per hour or passing large clots If you have persistent headache, visual changes, upper abdominal pain If you have persistent nausea, vomiting If your pain is severe and uncontrollable         Discharge Follow-up with Specified Provider: follow up with your primary OB provider in two weeks and in six weeks; 2 Weeks   As directed      To: follow up with your primary OB provider in two weeks and in six weeks   Follow Up: 2 Weeks   Follow Up Details: Call the office or follow up sooner if you are having any problems                La Mcdonnell MD  2/6/2025  10:07 EST

## 2025-02-06 NOTE — PLAN OF CARE
Goal Outcome Evaluation:  Plan of Care Reviewed With: patient        Progress: improving       Vitals stable, pain controlled with scheduled medications, pt ambulating self, assessment WDL, call light in reach, infant in room with care-by-parent care per NICU.

## 2025-02-06 NOTE — PROGRESS NOTES
2025    Name:Negrita Silvestre    MR#:[id number]     Progress Note:  Post-Op day 4 S/P    HD:4    Subjective   24 y.o. yo Female  s/p CS at 36w5d doing well. Pain well controlled. Tolerating regular diet and having flatus. Lochia normal. Really hopes to go home today. She notes some lower leg pain    Patient Active Problem List   Diagnosis    Congenital HIV infection    Diet controlled gestational diabetes mellitus (GDM) in third trimester    Maternal anemia in pregnancy, antepartum    Anxiety in pregnancy, antepartum     labor in third trimester    Rh negative status during pregnancy in third trimester    Increased BMI    Encounter for sterilization    Nausea and vomiting    Right upper quadrant abdominal pain    Left upper quadrant abdominal pain    Previous  delivery affecting pregnancy    Nausea and vomiting of pregnancy, antepartum    Hyperemesis complicating pregnancy, antepartum    Pregnancy    Short interval between pregnancies affecting pregnancy, antepartum    Polyhydramnios in third trimester    Status post bilateral salpingectomy     delivery delivered        Objective    Vitals  Temp:  Temp:  [97.7 °F (36.5 °C)-98.3 °F (36.8 °C)] 97.9 °F (36.6 °C)  Temp src: Oral  BP:  BP: (105-130)/(64-83) 113/74  Pulse:  Heart Rate:  [64-90] 75  RR:   Resp:  [16-20] 16  Weight:    BMI:  Body mass index is 47.26 kg/m².      General Awake, alert, no distress  Abdomen Soft, non-distended, fundus firm, 2 cm below umbilicus, appropriately tender  Incision  Intact, no erythema or exudate  Extremities Calf tenderness present bilaterally. No palpable cord, no erythema        No intake/output data recorded.    LABS:   Lab Results   Component Value Date    WBC 12.52 (H) 2025    HGB 11.6 (L) 2025    HCT 33.3 (L) 2025    MCV 88.8 2025     2025       Infant: male      Assessment/Plan   1.  POD 4  She complains of some calf tenderness this  morning and plan to obtain venous Dopplers.  If those are normal then she is appropriate for discharge today        Congenital HIV infection    Diet controlled gestational diabetes mellitus (GDM) in third trimester    Maternal anemia in pregnancy, antepartum    Anxiety in pregnancy, antepartum     labor in third trimester    Rh negative status during pregnancy in third trimester    Increased BMI    Encounter for sterilization    Previous  delivery affecting pregnancy    Short interval between pregnancies affecting pregnancy, antepartum    Polyhydramnios in third trimester    Status post bilateral salpingectomy     delivery delivered      La Mcdonnell MD  2025 09:59 EST

## 2025-02-06 NOTE — NURSING NOTE
Pt came out into the veloz at this time with c/o slight SOB, dizziness, nausea and diaphoresis while getting up to go to the bathroom. BGM is 120, pt is 98% on room air and does not appear to be in any distress. Cold washcloth and fan provided. Pt reports she just feels exhausted and needs to sleep. Will continue to monitor

## 2025-02-07 ENCOUNTER — MATERNAL SCREENING (OUTPATIENT)
Dept: CALL CENTER | Facility: HOSPITAL | Age: 25
End: 2025-02-07
Payer: COMMERCIAL

## 2025-02-07 NOTE — OUTREACH NOTE
Maternal Screening Survey      Flowsheet Row Responses   Eligibility Eligible   Prep survey completed? Yes   Facility patient discharged from? [facility] - [facility]

## 2025-02-08 ENCOUNTER — TELEPHONE (OUTPATIENT)
Dept: OBSTETRICS AND GYNECOLOGY | Age: 25
End: 2025-02-08
Payer: COMMERCIAL

## 2025-02-08 NOTE — TELEPHONE ENCOUNTER
"Received call from patient's mother through on-call line. She reports patient began having heavier bleeding today, passing golf ball sized clots and \"stringy\" material. Patient's mother also reports the patient appears pale and weak. Advised patient should present to L&D for evaluation. Patient's mother expressed they would head there.    Radha Wade MD  2/8/2025 17:54 EST   "

## 2025-02-08 NOTE — PROGRESS NOTES
"Continued Stay Note  [facility]     Patient Name: Negrita Silvestre  MRN: [id number]  Today's Date: 2/8/2025    Admit Date: 2/1/2025    Plan: Infant may discharge to mother when medically ready; CSW will follow cord tox. LISA Marrero.   Discharge Plan       Row Name 02/08/25 1106       Plan    Plan Comments Mother: Negrita Silvestre, MRN: [id number]; infant: Aakash \"Riky\" Nirmala, MRN: [id number]. CSW has reviewed infant's cord toxicology results and infant's cords was negative for any substances. Mandated CPS reporting is not required at this time. LISA Marrero.                   Discharge Codes    No documentation.                 Expected Discharge Date and Time       Expected Discharge Date Expected Discharge Time    Feb 6, 2025               BIBIANA Gomez    "

## 2025-02-14 RX ORDER — DARUNAVIR 600 MG
600 TABLET ORAL 2 TIMES DAILY WITH MEALS
Qty: 60 TABLET | Refills: 1 | Status: SHIPPED | OUTPATIENT
Start: 2025-02-14

## 2025-02-14 RX ORDER — RITONAVIR 100 MG/1
100 TABLET ORAL 2 TIMES DAILY
Qty: 60 TABLET | Refills: 1 | Status: SHIPPED | OUTPATIENT
Start: 2025-02-14

## 2025-02-14 RX ORDER — EMTRICITABINE AND TENOFOVIR ALAFENAMIDE 200; 25 MG/1; MG/1
1 TABLET ORAL DAILY
Qty: 30 TABLET | Refills: 1 | Status: SHIPPED | OUTPATIENT
Start: 2025-02-14

## 2025-02-15 ENCOUNTER — MATERNAL SCREENING (OUTPATIENT)
Dept: CALL CENTER | Facility: HOSPITAL | Age: 25
End: 2025-02-15
Payer: COMMERCIAL

## 2025-02-15 NOTE — OUTREACH NOTE
Maternal Screening Survey      Flowsheet Row Responses   Facility patient discharged from? [facility]   Attempt successful? No   Unsuccessful attempts Attempt 2              [facility] - Registered Nurse

## 2025-02-15 NOTE — OUTREACH NOTE
Maternal Screening Survey      Flowsheet Row Responses   Facility patient discharged from? [facility]   Attempt successful? No   Unsuccessful attempts Attempt 1              [facility] - Registered Nurse

## 2025-02-16 ENCOUNTER — MATERNAL SCREENING (OUTPATIENT)
Dept: CALL CENTER | Facility: HOSPITAL | Age: 25
End: 2025-02-16
Payer: COMMERCIAL

## 2025-02-16 NOTE — OUTREACH NOTE
Maternal Screening Survey      Flowsheet Row Responses   Facility patient discharged from? [facility]   Attempt successful? Yes   Call start time 1350   Call end time 1351   Person spoke with today (if not patient) and relationship Mom will give daughter the msg   Maternal Screening call completed Yes   Call end time 1351              DIANA CURRY - Registered Nurse

## 2025-02-17 ENCOUNTER — TELEPHONE (OUTPATIENT)
Dept: OBSTETRICS AND GYNECOLOGY | Age: 25
End: 2025-02-17
Payer: COMMERCIAL

## 2025-02-17 NOTE — TELEPHONE ENCOUNTER
Caller: Negrita Silvestre    Relationship to patient: Self    Best call back number:       Patient is needing: PT IS SCHEDULED FOR 03/03 FOR HER 6 WEEK PP APPT. SHE WAS SUPPOSED TO HAVE A TWO WEEK FU AS WELL BUT IT WAS NOT SCHEDULED. PT STATES SHE IS DOING WELL AND IS BOOKED WITH APPTS WITH THE BABY SEEING SPECIALISTS AND WANTS TO KNOW IF SHE CAN JUST BE SEEN ON 03/03

## 2025-02-18 NOTE — TELEPHONE ENCOUNTER
PT requested to be seen before her PP appointment. PT states that this issue is due to postpartum. PT states that her depression has hit her really hard. PT does not feel as if she will harm herself or the baby. I offered PT an appointment on Thursday with Dr. Daley and PT states she can not make it due to another appointment. I offered PT an appointment with NP today and she stated she does not have a car. PT then stated she will wait until her scheduled appointment on 3/3 please advise.

## 2025-03-06 ENCOUNTER — POSTPARTUM VISIT (OUTPATIENT)
Dept: OBSTETRICS AND GYNECOLOGY | Age: 25
End: 2025-03-06
Payer: COMMERCIAL

## 2025-03-06 VITALS
BODY MASS INDEX: 43.55 KG/M2 | HEIGHT: 59 IN | DIASTOLIC BLOOD PRESSURE: 70 MMHG | WEIGHT: 216 LBS | SYSTOLIC BLOOD PRESSURE: 114 MMHG

## 2025-03-06 DIAGNOSIS — T81.49XA POSTOPERATIVE WOUND CELLULITIS: ICD-10-CM

## 2025-03-06 RX ORDER — CEPHALEXIN 500 MG/1
500 CAPSULE ORAL 4 TIMES DAILY
Qty: 28 CAPSULE | Refills: 0 | Status: SHIPPED | OUTPATIENT
Start: 2025-03-06 | End: 2025-03-13

## 2025-03-06 NOTE — PROGRESS NOTES
GYN Visit    3/6/2025    Patient: Negrita Silvestre          MR#:[id number]      Chief Complaint   Patient presents with    Routine Prenatal Visit     Post Partum - CS delivery on 25, baby boy 8lb 13.5oz (Riky), last pap 24 neg, pt is bottle feeding, pt feels like she has a spot on incision that is open & states she feels it has been leaking, Pt stating her menses have been very heavy & she has felt weak due to this       History of Present Illness    24 y.o. female  who presents for postoperative     Baby is doing well  Patient is doing well she is just tired  She does have some redness around her incision and we will treat her with an antibiotic for this  She has had a bilateral salpingectomy  Her first period was really heavy  She would like to start on a birth control pill to control her cycles  Pap is up-to-date  No baby blues        Patient's last menstrual period was 2025 (approximate).    ________________________________________  Patient Active Problem List   Diagnosis    Congenital HIV infection    Diet controlled gestational diabetes mellitus (GDM) in third trimester    Maternal anemia in pregnancy, antepartum    Anxiety in pregnancy, antepartum     labor in third trimester    Rh negative status during pregnancy in third trimester    Increased BMI    Encounter for sterilization    Nausea and vomiting    Right upper quadrant abdominal pain    Left upper quadrant abdominal pain    Previous  delivery affecting pregnancy    Nausea and vomiting of pregnancy, antepartum    Hyperemesis complicating pregnancy, antepartum    Pregnancy    Short interval between pregnancies affecting pregnancy, antepartum    Polyhydramnios in third trimester    Status post bilateral salpingectomy     delivery delivered       Past Medical History:   Diagnosis Date    Anxiety     on Zoloft    Asperger's disorder     Depression     Gestational diabetes     diet controlled    HIV  "(human immunodeficiency virus infection)     congential, on meds    [place] teeth extracted        Past Surgical History:   Procedure Laterality Date     SECTION Bilateral 2025    Procedure:  SECTION REPEAT WITH SALPINGECTOMY;  Surgeon: Lianne Laboy MD;  Location: [facility] LABOR DELIVERY;  Service: Obstetrics/Gynecology;  Laterality: Bilateral;     SECTION WITH TUBAL N/A 2/10/2024    Procedure:  SECTION PRIMARY WITH TUBAL;  Surgeon: Nancy Sheth MD;  Location: [facility] LABOR DELIVERY;  Service: Obstetrics/Gynecology;  Laterality: N/A;    ENDOSCOPY N/A 2024    Procedure: ESOPHAGOGASTRODUODENOSCOPY WITH BIOPSIES;  Surgeon: Josue Wright MD;  Location: [facility] MAIN OR;  Service: Gastroenterology;  Laterality: N/A;  esophagitis    EYE SURGERY         Social History     Tobacco Use   Smoking Status Every Day    Current packs/day: 0.25    Average packs/day: 0.3 packs/day for 6.2 years (1.5 ttl pk-yrs)    Types: Cigarettes    Start date:     Passive exposure: Never   Smokeless Tobacco Never       has a current medication list which includes the following prescription(s): acetaminophen, prezista, diphenhydramine, descovy, ibuprofen, ondansetron odt, oxycodone, pantoprazole, promethazine, ritonavir, sertraline, cephalexin, norethindrone-ethinyl estradiol-ferrous fumarate, and prenatal vitamin.  ________________________________________    Current contraception: tubal ligation      The following portions of the patient's history were reviewed and updated as appropriate: allergies, current medications, past family history, past medical history, past social history, past surgical history, and problem list.    Review of Systems    Pertinent items are noted in HPI.     Objective   Physical Exam    /70 (BP Location: Left arm, Patient Position: Sitting)   Ht 149.9 cm (59\")   Wt 98 kg (216 lb)   LMP 2025 (Approximate)   Breastfeeding No   BMI 43.63 " "kg/m²    BP Readings from Last 3 Encounters:   25 114/70   25 113/74   25 114/74      Wt Readings from Last 3 Encounters:   25 98 kg (216 lb)   25 106 kg (234 lb)   25 106 kg (233 lb)      BMI: Estimated body mass index is 43.63 kg/m² as calculated from the following:    Height as of this encounter: 149.9 cm (59\").    Weight as of this encounter: 98 kg (216 lb).    Lungs: non labored breathing, no wheezing or tachpnea  Extremities: extremities normal, atraumatic, no cyanosis or edema  Skin: Skin color, texture, turgor normal. No rashes or lesions  Neurologic: Grossly normal  General:   alert, appears stated age, and cooperative   Abdomen: soft, non-tender, without masses or organomegaly and incision with some redness and induration midline otherwise intact and looks good                             Assessment:      Diagnoses and all orders for this visit:    1. Postpartum care following  delivery (Primary)    2. Postoperative wound cellulitis    Other orders  -     norethindrone-ethinyl estradiol-ferrous fumarate (LOESTIN 24 FE) 1-20 MG-MCG(24) per tablet; Take 1 tablet by mouth Daily.  Dispense: 84 tablet; Refill: 3  -     cephalexin (KEFLEX) 500 MG capsule; Take 1 capsule by mouth 4 (Four) Times a Day for 7 days.  Dispense: 28 capsule; Refill: 0              "

## 2025-03-12 ENCOUNTER — OFFICE VISIT (OUTPATIENT)
Dept: INFECTIOUS DISEASES | Facility: CLINIC | Age: 25
End: 2025-03-12
Payer: COMMERCIAL

## 2025-03-12 VITALS
SYSTOLIC BLOOD PRESSURE: 132 MMHG | DIASTOLIC BLOOD PRESSURE: 82 MMHG | BODY MASS INDEX: 44.56 KG/M2 | RESPIRATION RATE: 14 BRPM | WEIGHT: 220.6 LBS | HEART RATE: 77 BPM | TEMPERATURE: 98.1 F

## 2025-03-12 DIAGNOSIS — Z21 CONGENITAL HIV INFECTION: Primary | ICD-10-CM

## 2025-03-12 PROCEDURE — 99214 OFFICE O/P EST MOD 30 MIN: CPT | Performed by: STUDENT IN AN ORGANIZED HEALTH CARE EDUCATION/TRAINING PROGRAM

## 2025-03-12 NOTE — PROGRESS NOTES
Follow-up      HIV Follow-up Visit: Negrita Silvestre is a 24 y.o. female here for follow-up HIV visit. She is feeling unchanged since her last visit.      States she delivered early and has been very busy with both kids.  States that they are both healthy and doing well.  Denies any difficulty with pregnancy or delivery.  Has been taking her medications without any missed doses.  Denies any new sexual partners and is .  Reports 1 long-term partner.  Denies any concern for sexually transmitted infections today.  Partner is seronegative.    Past medical history:  Past Medical History:   Diagnosis Date    Anxiety     on Zoloft    Asperger's disorder     Depression     Gestational diabetes     diet controlled    HIV (human immunodeficiency virus infection)     congential, on meds    [place] teeth extracted        Medications:   Current Outpatient Medications:     acetaminophen (TYLENOL) 500 MG tablet, Take 1 tablet by mouth Every 6 (Six) Hours As Needed for Mild Pain., Disp: 40 tablet, Rfl: 1    cephalexin (KEFLEX) 500 MG capsule, Take 1 capsule by mouth 4 (Four) Times a Day for 7 days., Disp: 28 capsule, Rfl: 0    Darunavir (Prezista) 600 MG tablet, TAKE 1 TABLET BY MOUTH TWICE DAILY WITH MEALS, Disp: 60 tablet, Rfl: 1    diphenhydrAMINE (Benadryl Allergy) 25 MG tablet, Take 1 tablet by mouth At Night As Needed for Itching., Disp: 30 tablet, Rfl: 1    Emtricitabine-Tenofovir AF (Descovy) 200-25 MG per tablet, TAKE 1 TABLET BY MOUTH DAILY, Disp: 30 tablet, Rfl: 1    ibuprofen (ADVIL,MOTRIN) 600 MG tablet, Take 1 tablet by mouth Every 6 (Six) Hours As Needed for Mild Pain., Disp: 40 tablet, Rfl: 1    norethindrone-ethinyl estradiol-ferrous fumarate (LOESTIN 24 FE) 1-20 MG-MCG(24) per tablet, Take 1 tablet by mouth Daily., Disp: 84 tablet, Rfl: 3    ondansetron ODT (ZOFRAN-ODT) 8 MG disintegrating tablet, Place 1 tablet on the tongue Every 12 (Twelve) Hours As Needed for Nausea or Vomiting., Disp: 30 tablet,  Rfl: 1    oxyCODONE (Roxicodone) 5 MG immediate release tablet, Take 1 tablet by mouth Every 6 (Six) Hours As Needed for Moderate Pain., Disp: 12 tablet, Rfl: 0    pantoprazole (PROTONIX) 40 MG EC tablet, Take 1 tablet by mouth Daily., Disp: 90 tablet, Rfl: 3    Prenatal Vit-Fe Fumarate-FA (Prenatal Vitamin) 27-0.8 MG tablet, Take 1 tablet by mouth Daily., Disp: 30 tablet, Rfl: 12    promethazine (PHENERGAN) 12.5 MG tablet, Take 1 tablet by mouth Every 6 (Six) Hours As Needed for Nausea or Vomiting., Disp: 30 tablet, Rfl: 1    ritonavir (NORVIR) 100 MG tablet, TAKE 1 TABLET BY MOUTH TWICE DAILY, Disp: 60 tablet, Rfl: 1    sertraline (Zoloft) 100 MG tablet, Take 2 tablets by mouth Daily for 364 days., Disp: 180 tablet, Rfl: 3    Allergies:  is allergic to bactrim [sulfamethoxazole-trimethoprim], sulfa antibiotics, and strawberry.    Family History: family history is not on file. She was adopted.    Social History:  reports that she has been smoking cigarettes. She started smoking about 6 years ago. She has a 1.5 pack-year smoking history. She has never been exposed to tobacco smoke. She has never used smokeless tobacco. She reports that she does not currently use alcohol. She reports that she does not currently use drugs.    Review of Systems: All other reviewed and negative except as per HPI    Blood pressure 132/82, pulse 77, temperature 98.1 °F (36.7 °C), resp. rate 14, weight 100 kg (220 lb 9.6 oz), last menstrual period 02/28/2025, not currently breastfeeding.  GENERAL: Awake and alert, in no acute distress.   HEENT: Oropharynx is clear. Hearing is grossly normal.   EYES: PERRL. No conjunctival injection. No lid lag.   HEART: No peripheral edema.   LUNGS: Normal respiratory effort  ABDOMEN: Obese.  SKIN: Warm and dry without cutaneous eruptions in exposed areas  PSYCHIATRIC: Appropriate mood, affect, insight, and judgment.       DIAGNOSTICS:  Lab Results   Component Value Date    WBC 12.52 (H) 02/03/2025    HGB  "13.6 02/11/2025    HCT 33.3 (L) 02/03/2025     02/03/2025     No results found for: \"CRP\"  No results found for: \"SEDRATE\"  Lab Results   Component Value Date    GLUCOSE 100 (H) 02/02/2025    BUN 7 02/02/2025    CREATININE 0.46 (L) 02/02/2025    BCR 15.2 02/02/2025    CO2 18.8 (L) 02/02/2025    CALCIUM 8.0 (L) 02/02/2025    ALBUMIN 3.1 (L) 02/02/2025    LABIL2 1.1 07/08/2020    AST 13 02/02/2025    ALT 15 02/02/2025 7/8/2020 viral load undetectable and CD4 661, 44%  7/20/2023 viral load 4700  7/26/2023 CD4 792, 23%  8/14/2023 viral load 80  10/3/2023 CD4 578, 27% and viral load less than 20  12/11/2023 CD4 514, 25% and viral load less than 20  1/3/2024 viral load less than 40  1/19/2024 viral load less than 20  7/11/2024 viral load less than 20  2/1/2025 viral load undetectable    (Z21) Congenital HIV infection  Repeat labs today with physical prescription provided to the patient for CBC with differential, BMP, CD4 and viral load.    Will continue treatment with  twice daily ritonavir, twice daily darunavir and daily emtricitabine/tenofovir.  Medication adherence education provided by MD.  See lab orders.  Counseling provided on the prevention of transmission of HIV.  See diagnoses,orders and patient instructions.  Total Duration of Visit: 30 Minutes.  Total Counseling Time: 15 Minutes, counseling the patient regarding HIV prognosis, prevention of HIV transmittal to others, compliance with treatment plan, daily disease management, risk factor reduction, and meaning of diagnostic test results.   Follow up in 6 months.        Discussed with patient HIV diagnosis, goals of treatment, lab monitoring, means to reduce transmission, need for adherence to therapy as well as treatment plan, and ways to militate against the progression of disease.  Patient understands and all questions answered.  "

## 2025-03-19 ENCOUNTER — TELEPHONE (OUTPATIENT)
Dept: INFECTIOUS DISEASES | Facility: CLINIC | Age: 25
End: 2025-03-19
Payer: COMMERCIAL

## 2025-03-19 NOTE — TELEPHONE ENCOUNTER
After receiving Dr. Du's reply, I contacted patient's mother and informed her that per Dr. Du, [facility] was correct and patient will be continuing darunavir, ritonavir and Descovy for now. I told her that Dr. Du said that he plans to discuss patient's symptoms at her next appt w/ him 9/10/25. Mother stated understanding and denied having any questions.    ----- Message from Guanaco Du sent at 3/19/2025 10:29 AM EDT -----  Regarding: RE: Mother asking if Symtuz will be restarting.  [facility] specialty is correct.  We are continuing darunavir, ritonavir and Descovy for now.  We will plan to discuss symptoms at her next appointment.  Thank you.  ----- Message -----  From: Merline Kaur RN  Sent: 3/19/2025  10:28 AM EDT  To: Merline Kaur RN; Guanaco Du, DO  Subject: Mother asking if Symtuz will be restarting.      Patient mother called to clarify HIV med patient is to be on.  Patient was seen in office last week which mother could not attend.   Since pt had  25, she believes pt will be restarting her Symtuza but said that [facility] called to see about delivering the darunavir, ritonavir and Descovy.Please advise.

## 2025-05-20 RX ORDER — EMTRICITABINE AND TENOFOVIR ALAFENAMIDE 200; 25 MG/1; MG/1
1 TABLET ORAL DAILY
Qty: 30 TABLET | Refills: 1 | Status: SHIPPED | OUTPATIENT
Start: 2025-05-20

## 2025-05-20 RX ORDER — DARUNAVIR 600 MG
600 TABLET ORAL 2 TIMES DAILY WITH MEALS
Qty: 60 TABLET | Refills: 1 | Status: SHIPPED | OUTPATIENT
Start: 2025-05-20

## 2025-05-20 RX ORDER — RITONAVIR 100 MG/1
100 TABLET ORAL 2 TIMES DAILY
Qty: 60 TABLET | Refills: 1 | Status: SHIPPED | OUTPATIENT
Start: 2025-05-20

## 2025-07-23 RX ORDER — DARUNAVIR 600 MG/1
600 TABLET, FILM COATED ORAL 2 TIMES DAILY WITH MEALS
Qty: 60 TABLET | Refills: 1 | Status: SHIPPED | OUTPATIENT
Start: 2025-07-23

## 2025-07-23 NOTE — TELEPHONE ENCOUNTER
Patient called and would like to speak with you regarding her medication before you refill her medication. Thanks Gem     Call Back # [phone]

## (undated) DEVICE — ENSEAL 20 CM SHAFT, LARGE JAW: Brand: ENSEAL X1

## (undated) DEVICE — RETR PANNUS PANNI ADHS 1P/U LF STRL